# Patient Record
Sex: MALE | Race: WHITE
[De-identification: names, ages, dates, MRNs, and addresses within clinical notes are randomized per-mention and may not be internally consistent; named-entity substitution may affect disease eponyms.]

---

## 2017-03-04 NOTE — DIAGNOSTIC IMAGING REPORT
CT SCAN OF THE NECK WITH IV CONTRAST



CLINICAL HISTORY: Left neck pain. Neck mass.



COMPARISON STUDY:  No priors.



TECHNIQUE: Following the IV administration of 115 cc of Optiray 320, CT scan of

the soft tissues of the neck was performed from the skull base to the upper

chest. Images are reviewed in the axial, sagittal, and coronal planes.  IV

contrast was administered without complication.



CT DOSE: 423.94 mGy.cm



FINDINGS:



Pharynx: The nasopharynx, oropharynx, and laryngeal pharynx are normal in

appearance. The pharyngeal airway is widely patent. There is no evidence of mass

lesion. The vocal cords are symmetric. The parapharyngeal fat is well

maintained. The prevertebral/retropharyngeal soft tissues are within normal

limits. The epiglottis is normal.



Lymphadenopathy: No cervical lymphadenopathy is seen



Thyroid: Normal in size and attenuation.



Salivary glands: The parotid and submandibular glands are within normal limits.



Brain parenchyma: The visualized brain parenchyma at the skull base is normal in

appearance.



Vascular structures: The internal carotid arteries and jugular veins are widely

patent bilaterally. The cutaneous marker at the site of interest is located over

the left carotid bulb. There is mild intimal thickening seen within the distal

left common carotid artery and involving the proximal left internal carotid

artery. There is mild haziness in the surrounding fat. Similar but milder

findings are noted in the right carotid bulb.



Skeletal structures: Imaged portions of the calvarium at the skull base are

within normal limits. The cervical spine appears intact. Mild multilevel

cervical spondylosis is observed.



Sinuses and mastoids: Findings suggest previous paranasal sinus surgery. Mild

mucosal thickening is seen within the maxillary antra and the ethmoid sinuses.

The mastoid air cells are well pneumatized.



Lung apices: Visualized apical lung parenchyma is clear.





IMPRESSION:



1. The cutaneous marker at the site of interest overlies the left carotid bulb.

There is mild intimal thickening seen within the left carotid vessels at this

level with mild haziness in the surrounding fat. The appearance is nonspecific

but suggest a mild arteritis. Clinical correlation will be required.



2. Similar but milder findings are noted in the region of the right carotid

bulb.



3. The carotid vessels are widely patent.



4. There is no mass, fluid collection, or cervical lymphadenopathy identified.









Findings were discussed with Dr. Rodriguez in the emergency department at the

time of interpretation.







Electronically signed by:  Nehemiah Toth M.D.

3/4/2017 5:56 PM



Dictated Date/Time:  3/4/2017 5:40 PM

## 2017-03-04 NOTE — EMERGENCY ROOM VISIT NOTE
History


Report prepared by Pedro:  Mynor Dotson


Under the Supervision of:  Dr. Jose Rodriguez M.D.


First contact with patient:  14:56


Chief Complaint:  NECK PAIN


Stated Complaint:  PAIN IN LEFT SIDE OF NECK





History of Present Illness


The patient is a 61 year old male who presents to the Emergency Room with 

complaints of waxing & waning pain on the left side of the neck for the past 

two days. The pain was worse yesterday and last night than today. The pain is 

exacerbated when he pushes on his neck. The patient states that the pain is 

"where the artery is." He was seen at a [facility] urgent care clinic, where he 

was referred to the ED. The patient notes that he has had chest discomfort for 

several weeks. He denies visual changes, trouble speaking or swallowing, tooth 

aches, headaches, fevers,  rhinorrhea, shortness of breath, nausea, vomiting, 

weakness, numbness, pain or swelling of the legs. He has not been sick 

recently. The patient takes Omeprazole for GERD. He also takes Dilantin for a 

history of seizures and notes that he has not had a seizure for 12 years. The 

patient is not a smoker. He works as a  for the [employer].





   Source of History:  patient, spouse/significant other


   Onset:  two days ago


   Position:  neck


   Timing:  waxes/wanes


   Modifying Factors (Worsening):  other (touch)


   Associated Symptoms:  + chest pain, No SOB, No fevers, No headache, No nausea

, No numbness, No vomiting, No weakness





Review of Systems


See HPI for pertinent positives & negatives. A total of 10 systems reviewed and 

were otherwise negative.





Past Medical & Surgical


Medical Problems:


(1) GERD (gastroesophageal reflux disease)


(2) Seizure disorder





Old medical records were reviewed. Nurse's notes were reviewed and I agree with.





Family History





FH: heart disease


FHx: gallbladder disease


Hypertension





Social History


Smoking Status:  Never Smoker


Alcohol Use:  none


Occupation Status:  employed





Current/Historical Medications


Scheduled


Cyanocobalamin (B12), 1 TAB PO DAILY


Folic Acid (Folvite), 1 TAB PO DAILY


Omeprazole (Prilosec), 20 MG PO DAILY


Phenytoin Sodium Extended (Dilantin), 200 MG PO DAILY


Phenytoin Sodium Extended (Dilantin), 250 MG PO HS





Allergies


Coded Allergies:  


     Morphine (Unverified  Allergy, Unknown, IV, RED STREAKS UP ARM, 3/4/17)





Physical Exam


Vital Signs











  Date Time  Temp Pulse Resp B/P Pulse Ox O2 Delivery O2 Flow Rate FiO2


 


3/4/17 19:04 36.8 78 18 126/82 98   


 


3/4/17 18:35  78 18 126/82 98 Room Air  


 


3/4/17 15:39  68      


 


3/4/17 14:43 36.8 96 18 145/89 94 Room Air  











Physical Exam


General: Non ill-appearing middle aged male in no acute distress, speaking and 

swallowing without difficulty. 


HEENT: Normal cephalic atraumatic.  Pupils are equal round and reactive to 

light.  Sclerae anicteric.  Extraocular movements are intact.  Oropharynx is 

pink with moist mucous membranes, floor of mouth is soft without evidence of 

abscess.  No swelling of the mouth lips or tongue.


Neck: Supple with a midline trachea.  No meningeal signs or stiffness, no JVD 

or bruits. No Stridor. tenderness and swelling in the left anterior neck area 

in the area of the carotid on the left.  No definite mass.  No redness or 

warmth.


Chest: Clear to auscultation bilaterally.  No wheezes or rhonchi.  No increased 

work of breathing.


Heart: regular rate and rhythm. 


Abdomen: Soft nontender, nondistended without rebound guarding or rigidity.  


Extremities: No cyanosis clubbing or edema. No calf tenderness or assymetry


Spine/Back. Non tender to palpation. No CVA tenderness


Skin: Good turgor without rashes.


Neurologic exam: Cranial nerves two through 12 are intact.  Motor and sensation 

are intact and symmetrical throughout.





Medical Decision & Procedures


ER Provider


Diagnostic Interpretation:


Radiology results as stated below per my review and radiologist interpretation:





SINGLE VIEW CHEST





CLINICAL HISTORY:  Atypical chest pain.





FINDINGS: An AP, portable, upright chest radiograph is obtained. No prior


studies are available for comparison at the time of dictation. The examination


is degraded by portable technique and patient rotation.  The cardiomediastinal


silhouette is unremarkable. Nonspecific interstitial thickening is noted. The


lungs and pleural spaces are clear. No pneumothorax is seen. The bony thorax is


grossly intact.





IMPRESSION: No acute cardiopulmonary abnormality.











Electronically signed by:  Nehemiah Toth M.D.


3/4/2017 3:28 PM





Dictated Date/Time:  3/4/2017 3:27 PM








CT SCAN OF THE NECK WITH IV CONTRAST





CLINICAL HISTORY: Left neck pain. Neck mass.





COMPARISON STUDY:  No priors.





TECHNIQUE: Following the IV administration of 115 cc of Optiray 320, CT scan of


the soft tissues of the neck was performed from the skull base to the upper


chest. Images are reviewed in the axial, sagittal, and coronal planes.  IV


contrast was administered without complication.





CT DOSE: 423.94 mGy.cm





FINDINGS:





Pharynx: The nasopharynx, oropharynx, and laryngeal pharynx are normal in


appearance. The pharyngeal airway is widely patent. There is no evidence of mass


lesion. The vocal cords are symmetric. The parapharyngeal fat is well


maintained. The prevertebral/retropharyngeal soft tissues are within normal


limits. The epiglottis is normal.





Lymphadenopathy: No cervical lymphadenopathy is seen





Thyroid: Normal in size and attenuation.





Salivary glands: The parotid and submandibular glands are within normal limits.





Brain parenchyma: The visualized brain parenchyma at the skull base is normal in


appearance.





Vascular structures: The internal carotid arteries and jugular veins are widely


patent bilaterally. The cutaneous marker at the site of interest is located over


the left carotid bulb. There is mild intimal thickening seen within the distal


left common carotid artery and involving the proximal left internal carotid


artery. There is mild haziness in the surrounding fat. Similar but milder


findings are noted in the right carotid bulb.





Skeletal structures: Imaged portions of the calvarium at the skull base are


within normal limits. The cervical spine appears intact. Mild multilevel


cervical spondylosis is observed.





Sinuses and mastoids: Findings suggest previous paranasal sinus surgery. Mild


mucosal thickening is seen within the maxillary antra and the ethmoid sinuses.


The mastoid air cells are well pneumatized.





Lung apices: Visualized apical lung parenchyma is clear.








IMPRESSION:





1. The cutaneous marker at the site of interest overlies the left carotid bulb.


There is mild intimal thickening seen within the left carotid vessels at this


level with mild haziness in the surrounding fat. The appearance is nonspecific


but suggest a mild arteritis. Clinical correlation will be required.





2. Similar but milder findings are noted in the region of the right carotid


bulb.





3. The carotid vessels are widely patent.





4. There is no mass, fluid collection, or cervical lymphadenopathy identified.














Findings were discussed with Dr. Rodriguez in the emergency department at the


time of interpretation.











Electronically signed by:  Nehemiah Toth M.D.


3/4/2017 5:56 PM





Dictated Date/Time:  3/4/2017 5:40 PM





Laboratory Results


3/4/17 16:00








Red Blood Count 4.60, Mean Corpuscular Volume 89.3, Mean Corpuscular Hemoglobin 

32.0, Mean Corpuscular Hemoglobin Concent 35.8, Mean Platelet Volume 9.3, 

Neutrophils (%) (Auto) 61.3, Lymphocytes (%) (Auto) 25.3, Monocytes (%) (Auto) 

9.7, Eosinophils (%) (Auto) 2.9, Basophils (%) (Auto) 0.5, Neutrophils # (Auto) 

3.99, Lymphocytes # (Auto) 1.65, Monocytes # (Auto) 0.63, Eosinophils # (Auto) 

0.19, Basophils # (Auto) 0.03





3/4/17 16:00

















Test


  3/4/17


16:00 3/4/17


16:05


 


White Blood Count


  6.51 K/uL


(4.8-10.8) 


 


 


Red Blood Count


  4.60 M/uL


(4.7-6.1) 


 


 


Hemoglobin


  14.7 g/dL


(14.0-18.0) 


 


 


Hematocrit 41.1 % (42-52)  


 


Mean Corpuscular Volume


  89.3 fL


() 


 


 


Mean Corpuscular Hemoglobin


  32.0 pg


(25-34) 


 


 


Mean Corpuscular Hemoglobin


Concent 35.8 g/dl


(32-36) 


 


 


Platelet Count


  152 K/uL


(130-400) 


 


 


Mean Platelet Volume


  9.3 fL


(7.4-10.4) 


 


 


Neutrophils (%) (Auto) 61.3 %  


 


Lymphocytes (%) (Auto) 25.3 %  


 


Monocytes (%) (Auto) 9.7 %  


 


Eosinophils (%) (Auto) 2.9 %  


 


Basophils (%) (Auto) 0.5 %  


 


Neutrophils # (Auto)


  3.99 K/uL


(1.4-6.5) 


 


 


Lymphocytes # (Auto)


  1.65 K/uL


(1.2-3.4) 


 


 


Monocytes # (Auto)


  0.63 K/uL


(0.11-0.59) 


 


 


Eosinophils # (Auto)


  0.19 K/uL


(0-0.5) 


 


 


Basophils # (Auto)


  0.03 K/uL


(0-0.2) 


 


 


RDW Standard Deviation


  43.4 fL


(36.4-46.3) 


 


 


RDW Coefficient of Variation


  13.2 %


(11.5-14.5) 


 


 


Immature Granulocyte % (Auto) 0.3 %  


 


Immature Granulocyte # (Auto)


  0.02 K/uL


(0.00-0.02) 


 


 


Erythrocyte Sedimentation Rate 2 mm/hr (0-14)  


 


Anion Gap


  10.0 mmol/L


(3-11) 


 


 


Est Creatinine Clear Calc


Drug Dose 84.3 ml/min 


  


 


 


Estimated GFR (


American) 107.0 


  


 


 


Estimated GFR (Non-


American 92.3 


  


 


 


BUN/Creatinine Ratio 21.7 (10-20)  


 


Calcium Level


  8.2 mg/dl


(8.5-10.1) 


 


 


Total Bilirubin


  0.1 mg/dl


(0.2-1) 


 


 


Direct Bilirubin


  < 0.1 mg/dl


(0-0.2) 


 


 


Aspartate Amino Transf


(AST/SGOT) 15 U/L (15-37) 


  


 


 


Alanine Aminotransferase


(ALT/SGPT) 24 U/L (12-78) 


  


 


 


Alkaline Phosphatase


  121 U/L


() 


 


 


C-Reactive Protein


  < 0.29 mg/dl


(0-0.29) 


 


 


Total Protein


  6.8 gm/dl


(6.4-8.2) 


 


 


Albumin


  3.6 gm/dl


(3.4-5.0) 


 


 


Lipase


  83 U/L


() 


 


 


Bedside Troponin I


  


  0.000 ng/ml


(0-0.045)





Laboratory studies as stated above per my review.





Medications Administered











 Medications


  (Trade)  Dose


 Ordered  Sig/Angélica


 Route  Start Time


 Stop Time Status Last Admin


Dose Admin


 


 Sodium Chloride  250 ml @ 


 999 mls/hr  Q16M STAT


 IV  3/4/17 15:07


 3/4/17 15:22 DC 3/4/17 16:07


999 MLS/HR


 


 Sodium Chloride


  (Nss 1000ml)  1,000 ml @ 


 100 mls/hr  Q10H STAT


 IV  3/4/17 15:07


 3/4/17 19:20 DC 3/4/17 16:08


100 MLS/HR











ECG


Indication:  other (left neck pain)


Rate (beats per minute):  65


Rhythm:  normal sinus


Findings:  RBBB, no acute ischemic change, no ectopy


Comparison ECG Date:  no prior available





ED Course


1500: Past medical records reviewed. The patient was evaluated in room B7, and 

a complete history and physical examination were performed. 





1507: NSS 1000 ml @ 100 mls/hr,  ml @ 999 mls/hr.





1625: The patient is comfortable. He will go to CT scan.





1819: Spoke with Dr. King, Vascular Surgeon. He recommended follow up with 

Rheumatology.





1830: spoke with the on call Rheumatologist, who does not believe that the 

patient has vasculitis as his sed rate is normal.





1850: Reassessed the patient. Discussed the findings with him. He verbalized 

understanding and agreement of the treatment plan. The patient is ready for 

discharge.





Medical Decision


Differential diagnosis includes lymphadenopathy, infection, vascular pathology, 

malignancy.





This patient comes in as described above.  He had left anterior neck pain.  It'

s the area chronic near the carotid.  He has a normal neurologic exam and has 

no other symptoms.  He's had no injury.  There is no respiratory symptoms.  He 

is no evidence of airway compromise.  No trouble speaking or swallowing.  No 

chest pain or shortness of breath.  IV access established and multiple blood 

testing was obtained.  He has no white count or fever to suggest infection.  He 

has negative inflammatory markers including a normal sedimentation rate and 

CRP.  He has no electrolyte and metabolic abnormality.  He has nothing to 

suggest an acute coronary syndrome or arrhythmia.  I did a CT of his neck there 

is no acute abnormality seen with exception of questionable nonspecific changes 

around the left carotid.  It could represent a mild arteritis according to the 

radiologist who I talked to.  I did talk to Dr. King, the vascular surgeon on-

call, and he did not feel is any other acute treatment needed at this point.  I 

also talked the rheumatologist who felt that this was unlikely arteritis given 

the fact that the sedimentation rate was normal.  The patient looks well.  I 

will have him use anti-inflammatories.  I encouraged close follow-up with his 

doctor Monday for recheck or return to the week ER over the weekend if: 

Increasing pain, swelling, redness, warmth, numbness, weakness, chest pain, 

shortness of breath, any new problems or concerns.  The patient and his wife, 

who I talked to at length as well, were both happy with plan and he was 

discharged to home.





Consults


Time Called:  1810


Consulting Physician:  Dr. King, Vascular Surgeon.


Returned Call:  1819 1819: Spoke with Dr. King, Vascular Surgeon. He recommended follow up with 

Rheumatology.


Additional Consults:  


   Time Called:  1820


   Consulted Physician:  Rheumatologist


   Returned Call:  1830


Additional Comments:


1830: spoke with the on call Rheumatologist, who does not believe that the 

patient has vasculitis as his sed rate is normal.





Impression





 Primary Impression:  


 Carotidynia


 Additional Impression:  


 Neck pain, acute





Scribe Attestation


The scribe's documentation has been prepared under my direction and personally 

reviewed by me in its entirety. I confirm that the note above accurately 

reflects all work, treatment, procedures, and medical decision making performed 

by me.





Departure Information


Dispostion


Home / Self-Care





Referrals


Jesus Paulson D.O. (PCP)





Forms


HOME CARE DOCUMENTATION FORM,                                                 

               IMPORTANT VISIT INFORMATION, WORK / SCHOOL INSTRUCTIONS





Patient Instructions


My [facility]





Additional Instructions





Rest.  Drink plenty of fluids.





For pain, Use ibuprofen 400 mg every 6 hours, take with food





Return if: Increasing pain, fever or chills, worsening symptoms, numbness or 

weakness, any new problems or concerns.





Problem Qualifiers

## 2017-03-04 NOTE — DIAGNOSTIC IMAGING REPORT
SINGLE VIEW CHEST



CLINICAL HISTORY:  Atypical chest pain.



FINDINGS: An AP, portable, upright chest radiograph is obtained. No prior

studies are available for comparison at the time of dictation. The examination

is degraded by portable technique and patient rotation.  The cardiomediastinal

silhouette is unremarkable. Nonspecific interstitial thickening is noted. The

lungs and pleural spaces are clear. No pneumothorax is seen. The bony thorax is

grossly intact.



IMPRESSION: No acute cardiopulmonary abnormality.







Electronically signed by:  Nehemiah Toth M.D.

3/4/2017 3:28 PM



Dictated Date/Time:  3/4/2017 3:27 PM

## 2017-05-24 NOTE — DIAGNOSTIC IMAGING REPORT
CHEST ONE VIEW PORTABLE



CLINICAL HISTORY: Weakness. Fever.    



COMPARISON STUDY:  Chest radiograph March 4, 2017.



FINDINGS: There is no pneumothorax or pleural effusion. Lung volumes are

diminished. Bibasilar opacities are present. Cardiac size is normal. There is no

evidence of pulmonary edema. 



IMPRESSION:  Bibasilar opacities which statistically reflect atelectasis on this

hypoventilatory study. Consolidation is considered less likely. 









Electronically signed by:  Cruz Deluna M.D.

5/24/2017 10:25 PM



Dictated Date/Time:  5/24/2017 10:25 PM

## 2017-05-25 NOTE — PROGRESS NOTE
Internal Med Progress Note


Date of Service:


May 25, 2017.


Provider Documentation:





SUBJECTIVE:





says he is feeling somewhat better today


has some sob and cough


afebrile


no nausea


denies chest pain


complains of headache








OBJECTIVE:





Vital Signs-as noted below





Exam:


General-alert and awake and oriented x 3


ENT-normal hearing


Neck-no neck masses


Lungs-cta b/l no wheezing or crackles


Heart-s1 and s2 heard regular rate and rhythm no murmurs


Abdomen-soft bowel sounds present non tender no distension 


Extremities- no edema present no erythema


Neuro-alert and awake oriented x 3


moves extremities


Lab data as noted below.


ASSESSMENT & PLAN:


1.  Bibasilar pneumonia with influenza B.  


on Tamiflu and Levaquin


improving


will monitor


2.  Epilepsy. On phenytoin.  stable.





3.  Esophageal reflux,  on omeprazole.  





4.  Asthma in remission, does not use any medications, has minimal wheezing. 


Will give nebulized bronchodilator while in the hospital.Stable





5.  Deep venous thrombosis prophylaxis with subcutaneous heparin.





6.  Gastrointestinal prophylaxis with omeprazole.





7.  Code status.  He will be a full code.








DISPOSITION


to be determined


Vital Signs:











  Date Time  Temp Pulse Resp B/P Pulse Ox O2 Delivery O2 Flow Rate FiO2


 


5/25/17 16:00      Room Air  


 


5/25/17 15:35 36.9 90 20 109/65 91 Room Air  


 


5/25/17 14:56  87 14  98 Room Air  


 


5/25/17 08:00      Room Air  


 


5/25/17 07:40 37.2 99 16 117/67 90   


 


5/25/17 01:45 37.2 95 16 103/62 95 Room Air  


 


5/25/17 01:23  95 18 113/66 96   


 


5/24/17 23:25  107 16 126/78 96 Nasal Cannula 2.0 


 


5/24/17 22:32  108      


 


5/24/17 22:28     94 Room Air  


 


5/24/17 21:47 38.8 120 28 122/80 94 Room Air  








Lab Results:





Results Past 24 Hours








Test


  5/24/17


22:00 5/24/17


22:16 5/24/17


22:18 5/24/17


22:30 Range/Units


 


 


White Blood Count 6.04    4.8-10.8  K/uL


 


Red Blood Count 4.63    4.7-6.1  M/uL


 


Hemoglobin 14.5    14.0-18.0  g/dL


 


Hematocrit 41.5    42-52  %


 


Mean Corpuscular Volume 89.6      fL


 


Mean Corpuscular Hemoglobin 31.3    25-34  pg


 


Mean Corpuscular Hemoglobin


Concent 34.9


  


  


  


  32-36  g/dl


 


 


Platelet Count 137    130-400  K/uL


 


Mean Platelet Volume 9.6    7.4-10.4  fL


 


Neutrophils (%) (Auto) 76.9     %


 


Lymphocytes (%) (Auto) 11.6     %


 


Monocytes (%) (Auto) 9.9     %


 


Eosinophils (%) (Auto) 1.2     %


 


Basophils (%) (Auto) 0.2     %


 


Neutrophils # (Auto) 4.65    1.4-6.5  K/uL


 


Lymphocytes # (Auto) 0.70    1.2-3.4  K/uL


 


Monocytes # (Auto) 0.60    0.11-0.59  K/uL


 


Eosinophils # (Auto) 0.07    0-0.5  K/uL


 


Basophils # (Auto) 0.01    0-0.2  K/uL


 


RDW Standard Deviation 44.9    36.4-46.3  fL


 


RDW Coefficient of Variation 13.7    11.5-14.5  %


 


Immature Granulocyte % (Auto) 0.2     %


 


Immature Granulocyte # (Auto) 0.01    0.00-0.02  K/uL


 


Prothrombin Time


  10.5


  


  


  


  9.0-12.0


SECONDS


 


Prothromb Time International


Ratio 1.0


  


  


  


  0.9-1.1  


 


 


Activated Partial


Thromboplast Time 29.5


  


  


  


  21.0-31.0


SECONDS


 


Partial Thromboplastin Ratio 1.1     


 


Sodium Level 139    136-145  mmol/L


 


Potassium Level 3.4    3.5-5.1  mmol/L


 


Chloride Level 105      mmol/L


 


Carbon Dioxide Level 25    21-32  mmol/L


 


Anion Gap 9.0    3-11  mmol/L


 


Blood Urea Nitrogen 11    7-18  mg/dl


 


Creatinine


  0.95


  


  


  


  0.60-1.40


mg/dl


 


Est Creatinine Clear Calc


Drug Dose 78.0


  


  


  


   ml/min


 


 


Estimated GFR (


American) 99.0


  


  


  


   


 


 


Estimated GFR (Non-


American 85.4


  


  


  


   


 


 


BUN/Creatinine Ratio 11.8    10-20  


 


Random Glucose 132    70-99  mg/dl


 


Calcium Level 8.4    8.5-10.1  mg/dl


 


Magnesium Level 2.0    1.8-2.4  mg/dl


 


Total Bilirubin 0.6    0.2-1  mg/dl


 


Direct Bilirubin 0.3    0-0.2  mg/dl


 


Aspartate Amino Transf


(AST/SGOT) 74


  


  


  


  15-37  U/L


 


 


Alanine Aminotransferase


(ALT/SGPT) 67


  


  


  


  12-78  U/L


 


 


Alkaline Phosphatase 115      U/L


 


Troponin I < 0.015    0-0.045  ng/ml


 


Total Protein 6.9    6.4-8.2  gm/dl


 


Albumin 3.7    3.4-5.0  gm/dl


 


Lipase 166      U/L


 


Thyroid Stimulating Hormone


(TSH) 0.980


  


  


  


  0.300-4.500


uIu/ml


 


Bedside Lactic Acid Venous


  


  1.23


  


  


  0.90-1.70


mmol/L


 


Urine Color   DK YELLOW   


 


Urine Appearance   CLEAR  CLEAR  


 


Urine pH   6.5  4.5-7.5  


 


Urine Specific Gravity   1.017  1.000-1.030  


 


Urine Protein   NEG  NEG  


 


Urine Glucose (UA)   NEG  NEG  


 


Urine Ketones   1+  NEG  


 


Urine Occult Blood   NEG  NEG  


 


Urine Nitrite   NEG  NEG  


 


Urine Bilirubin   NEG  NEG  


 


Urine Urobilinogen   NEG  NEG  


 


Urine Leukocyte Esterase   NEG  NEG  


 


Influenza Type A Antigen    Neg for Influ A NEG  


 


Influenza Type B Antigen    POS for Influ B NEG  














Test


  5/24/17


23:54 5/25/17


06:53 


  


  Range/Units


 


 


Troponin I < 0.015    0-0.045  ng/ml


 


White Blood Count  3.99   4.8-10.8  K/uL


 


Red Blood Count  4.14   4.7-6.1  M/uL


 


Hemoglobin  13.1   14.0-18.0  g/dL


 


Hematocrit  37.4   42-52  %


 


Mean Corpuscular Volume  90.3     fL


 


Mean Corpuscular Hemoglobin  31.6   25-34  pg


 


Mean Corpuscular Hemoglobin


Concent 


  35.0


  


  


  32-36  g/dl


 


 


RDW Standard Deviation  46.0   36.4-46.3  fL


 


RDW Coefficient of Variation  13.7   11.5-14.5  %


 


Platelet Count  119   130-400  K/uL


 


Mean Platelet Volume  9.6   7.4-10.4  fL


 


Sodium Level  138   136-145  mmol/L


 


Potassium Level  3.6   3.5-5.1  mmol/L


 


Chloride Level  105     mmol/L


 


Carbon Dioxide Level  26   21-32  mmol/L


 


Anion Gap  7.0   3-11  mmol/L


 


Blood Urea Nitrogen  10   7-18  mg/dl


 


Creatinine


  


  0.87


  


  


  0.60-1.40


mg/dl


 


Est Creatinine Clear Calc


Drug Dose 


  85.1


  


  


   ml/min


 


 


Estimated GFR (


American) 


  107.2


  


  


   


 


 


Estimated GFR (Non-


American 


  92.5


  


  


   


 


 


BUN/Creatinine Ratio  11.1   10-20  


 


Random Glucose  118   70-99  mg/dl


 


Calcium Level  7.4   8.5-10.1  mg/dl


 


Phosphorus Level  3.1   2.5-4.9  mg/dl


 


Lyme Disease IgG Antibody  NEG   NEG  


 


Lyme Disease IgM Antibody  NEG   NEG  








 Microbiology Results


5/24/17 Blood Culture, Received


          Pending


5/24/17 Blood Culture, Received


          Pending


5/24/17 Urine Culture - Preliminary, Resulted


          PIN-POINT GROWTH PRESENT, REINCUBATING.

## 2017-05-25 NOTE — HISTORY & PHYSICAL EXAMINATION
DATE OF ADMISSION:  05/24/2017

 

PRIMARY CARE PHYSICIAN:  Dr Paulson

 

CHIEF COMPLAINT:  Cough with fever and weakness since Monday night.

 

HISTORY OF PRESENT COMPLAINT:  He is a 62-year-old male with significant past

medical history of epilepsy, esophageal reflux, asthma in remission and also

chronic back pain, apparently has been complaining of cough with weakness and

fever since Monday midnight.  He woke up at midnight Monday with cough.  At

that time, he was feeling warm and had some cough without any production of

any phlegm.  He went to work but the condition has been getting worse. 

Today, he was really very tired, very warm and sweaty at times, more cough

and some shortness of breath.  He came to Emergency Room for a checkup.  He

was noted to have a fever of 38.8 with a pulse rate of 120 initially and

normal saturation, and he was positive for influenza B and also x-ray did

show bibasilar infiltration.  From that point, blood culture was taken and he

was started with intravenous Levaquin and also oral Tamiflu and was advised

admission.

 

When asking question, he mentions that he has been in [place] recently and

while in there, he had a bite by an insect on his left cheek and that was red

and just light a dime in size.  He does have some swelling in the left facial

area since then, but the bite blanca is gone.  He denies to have any chest

pain.  He does not have any shortness of breath.  He denies to have any

swelling of the legs or any joint pain.  He does not have any nausea or

vomiting.  He does not have any headache or any blurred vision, or any

numbness or tingling in the extremities.

 

PAST MEDICAL HISTORY:  Significant for epilepsy, esophageal reflux, history

of right bundle branch block, asthma in remission, and back pain secondary to

lumbar intervertebral disk disease.

 

PAST SURGICAL HISTORY:  Repair of nasal septum and laparoscopic hernia

repair.

 

FAMILY HISTORY:  Father had CAD and PTCA with a stent and mother had CAD as

well.

 

SOCIAL HISTORY:  He is .  He lives with his wife.  He has 2 children. 

He does not smoke and drinks occasionally and he has a full time job.

 

ALLERGIES:  MORPHINE.

 

MEDICATIONS:  He has been on cyanocobalamin 1000 mcg daily, folic acid 1 mg

daily, omeprazole 20 mg daily, and phenytoin sodium 200 mg twice daily.

 

REVIEW OF SYSTEMS:  Other systemic review unremarkable except for those

mentioned in history of present complaint.

 

PHYSICAL EXAMINATION:

GENERAL:  On examination in the Emergency Room, he was not having any acute

distress except some weakness and cough, nonproductive.

HEENT:  Unremarkable.

VITAL SIGNS:  Temperature 38.8, pulse was 107, blood pressure 126/78,

saturation 96% on 2 liters nasal cannula.

HEENT:  Unremarkable.

NECK:  Supple.  No JVD, no bruit, no lymphadenopathy.

CHEST:  Occasional crackles at the bases.

HEART:  S1, S2 regular, no murmur.

ABDOMEN:  Soft, benign, nontender, no organomegaly.  Bowel sounds present.

EXTREMITIES:  Negative for any edema.

MUSCULOSKELETAL:  Did not show any acute arthritis.

CENTRAL NERVOUS SYSTEM:  Alert, awake, oriented x3.  No focal sensory and/or

motor deficit appreciated.

 

LABORATORY DATA:  Noted today - white count was 6.04, H\T\H 14.5/41.5,

platelets was 137.  Sodium 139, potassium 3.4, chloride 105, carbon dioxide

25, BUN 11, creatinine 0.95, random glucose 132, lactic acid 1.23, calcium

8.4, magnesium 2.0.  Total bili 0.6, AST 74, ALT 67, alkaline phosphatase

115.  Troponin less than 0.015.  TSH was 0.980.  Lipase 166.   PT/INR

unremarkable.  UA examination unremarkable.

 

IMAGING DATA:  Echocardiogram is pending and chest x-ray reported as

bibasilar opacities which statistically reflect atelectasis, consolidation is

considered less likely.

 

IMPRESSION AND PLAN:

1.  Bibasilar pneumonia with influenza B.  The patient will be admitted to

medical floor.  Respiratory isolation.  He was started with intravenous

Levaquin after taking blood culture and also oral Tamiflu.  He was given

nebulized bronchodilator for ongoing cough and wheezing.  He will have IV

fluid with potassium.Lyme titer for possible tick bite about 2-3 weeks ago.

2.  Epilepsy.  Has not had any seizure for the last 10 years.  Continue with

phenytoin.  We will check phenytoin level tomorrow morning.

3.  Esophageal reflux, has been on omeprazole.  Continue with that

4.  Asthma in remission, does not use any medications, has minimal wheezing. 

Will give nebulized bronchodilator while in the hospital.

5.  Deep venous thrombosis prophylaxis with subcutaneous heparin.

6.  Gastrointestinal prophylaxis with omeprazole.

7.  Code status.  He will be a full code.

 

In my clinical judgment, the beneficiary meets criteria as per CMS for

2-midnight stay in the hospital.

 

 

 

MTDDANIS

## 2017-05-25 NOTE — EMERGENCY ROOM VISIT NOTE
History


Report prepared by Pedro:  Pretty Ferrari


Under the Supervision of:  Dr. Ubaldo Corea M.D.


First contact with patient:  21:52


Chief Complaint:  FEVER


Stated Complaint:  FEVER, VERY COLD, LABORED BREATHING SINCE 5/22





History of Present Illness


The patient is a 62 year old male who presents to the Emergency Room with 

complaints of worsening fever beginning 2 days ago.  He currently rates his 

discomfort as an 8/10 in severity. The patient reports he had a sinus infection 

two weeks ago prior to his cruise that was treated with antibiotics. The 

patient notes that he noticed flu-like symptoms around midnight 2 days ago. The 

patient complains of swelling, pain, and erythema on the left side of his face, 

describing his discomfort as a burning pain. The patient states he feels a 

stinging in his left eye. The patient still notes discomfort to the left side 

of his face. The patient also complains of a cough and shortness of breath. He 

reports he can breathe air in but has difficulty in getting air out. The 

patient states that symptoms exacerbate with movement. He reports that he has 

been taking Mucinex, Tylenol and Ibuprofen without relief of his symptoms.





   Source of History:  patient


   Onset:  two days ago


   Position:  other (global)


   Symptom Intensity:  8/10


   Quality:  other (fever)


   Timing:  worsening


   Associated Symptoms:  + SOB, + cough


Note:


Associated Symtpoms: Pain swelling erythema on left side of face, flu like 

symptoms





Review of Systems


See HPI for pertinent positives and negatives.  A total of ten systems were 

reviewed and were otherwise negative.





Past Medical & Surgical


Medical Problems:


(1) GERD (gastroesophageal reflux disease)


(2) Influenza B


(3) Pneumonia


(4) Seizure disorder








Family History





FH: heart disease


FHx: gallbladder disease


Hypertension





Social History


Smoking Status:  Never Smoker


Alcohol Use:  none


Occupation Status:  employed





Current/Historical Medications


Scheduled


Cyanocobalamin (B12), 1 TAB PO DAILY


Folic Acid (Folvite), 1 TAB PO DAILY


Omeprazole (Prilosec), 20 MG PO DAILY


Phenytoin Sodium Extended (Dilantin), 200 MG PO BID





Allergies


Coded Allergies:  


     Morphine (Unverified  Allergy, Unknown, IV, RED STREAKS UP ARM, 5/24/17)





Physical Exam


Vital Signs











  Date Time  Temp Pulse Resp B/P Pulse Ox O2 Delivery O2 Flow Rate FiO2


 


5/24/17 23:25  107 16 126/78 96 Nasal Cannula 2.0 


 


5/24/17 22:32  108      


 


5/24/17 22:28     94 Room Air  


 


5/24/17 21:47 38.8 120 28 122/80 94 Room Air  











Physical Exam


GENERAL: Awake, alert, mildly ill and very uncomfortable appearing, no distress


HEAD: Normocephalic, atraumatic. No edema.


EYES: Normal conjunctiva. Sclera non-icteric.


EARS: Right TM normal. Left TM normal.


NOSE: Mild congestion.


OROPHARYNX: Lips, tongue, and mucosa unremarkable. No erythema or exudate.


NECK: Supple. No nuchal rigidity. FROM. No adenopathy.  Negative jolt 

accentuation test.


RESPIRATORY: CTA bilaterally. No wheezes rales or rhonchi.


CARDIAC: Borderline tachycardic rate, normal rhythm.


ABDOMEN: Soft, non distended. No tenderness to palpation. 


NEURO: Normal sensorium. 


SKIN: No rash or jaundice noted





Medical Decision & Procedures


ER Provider


Diagnostic Interpretation:


X-ray: Per my interpretation, radiologist review. 





CHEST ONE VIEW PORTABLE





CLINICAL HISTORY: Weakness. Fever.    





COMPARISON STUDY:  Chest radiograph March 4, 2017.





FINDINGS: There is no pneumothorax or pleural effusion. Lung volumes are


diminished. Bibasilar opacities are present. Cardiac size is normal. There is no


evidence of pulmonary edema. 





IMPRESSION:  Bibasilar opacities which statistically reflect atelectasis on this


hypoventilatory study. Consolidation is considered less likely. 





Electronically signed by:  Cruz Deluna M.D.


5/24/2017 10:25 PM





Dictated Date/Time:  5/24/2017 10:25 PM





Laboratory Results


5/24/17 22:00








Red Blood Count 4.63, Mean Corpuscular Volume 89.6, Mean Corpuscular Hemoglobin 

31.3, Mean Corpuscular Hemoglobin Concent 34.9, Mean Platelet Volume 9.6, 

Neutrophils (%) (Auto) 76.9, Lymphocytes (%) (Auto) 11.6, Monocytes (%) (Auto) 

9.9, Eosinophils (%) (Auto) 1.2, Basophils (%) (Auto) 0.2, Neutrophils # (Auto) 

4.65, Lymphocytes # (Auto) 0.70, Monocytes # (Auto) 0.60, Eosinophils # (Auto) 

0.07, Basophils # (Auto) 0.01





5/24/17 22:00

















Test


  5/24/17


22:00 5/24/17


22:16 5/24/17


22:18 5/24/17


22:30


 


White Blood Count


  6.04 K/uL


(4.8-10.8) 


  


  


 


 


Red Blood Count


  4.63 M/uL


(4.7-6.1) 


  


  


 


 


Hemoglobin


  14.5 g/dL


(14.0-18.0) 


  


  


 


 


Hematocrit 41.5 % (42-52)    


 


Mean Corpuscular Volume


  89.6 fL


() 


  


  


 


 


Mean Corpuscular Hemoglobin


  31.3 pg


(25-34) 


  


  


 


 


Mean Corpuscular Hemoglobin


Concent 34.9 g/dl


(32-36) 


  


  


 


 


Platelet Count


  137 K/uL


(130-400) 


  


  


 


 


Mean Platelet Volume


  9.6 fL


(7.4-10.4) 


  


  


 


 


Neutrophils (%) (Auto) 76.9 %    


 


Lymphocytes (%) (Auto) 11.6 %    


 


Monocytes (%) (Auto) 9.9 %    


 


Eosinophils (%) (Auto) 1.2 %    


 


Basophils (%) (Auto) 0.2 %    


 


Neutrophils # (Auto)


  4.65 K/uL


(1.4-6.5) 


  


  


 


 


Lymphocytes # (Auto)


  0.70 K/uL


(1.2-3.4) 


  


  


 


 


Monocytes # (Auto)


  0.60 K/uL


(0.11-0.59) 


  


  


 


 


Eosinophils # (Auto)


  0.07 K/uL


(0-0.5) 


  


  


 


 


Basophils # (Auto)


  0.01 K/uL


(0-0.2) 


  


  


 


 


RDW Standard Deviation


  44.9 fL


(36.4-46.3) 


  


  


 


 


RDW Coefficient of Variation


  13.7 %


(11.5-14.5) 


  


  


 


 


Immature Granulocyte % (Auto) 0.2 %    


 


Immature Granulocyte # (Auto)


  0.01 K/uL


(0.00-0.02) 


  


  


 


 


Prothrombin Time


  10.5 SECONDS


(9.0-12.0) 


  


  


 


 


Prothromb Time International


Ratio 1.0 (0.9-1.1) 


  


  


  


 


 


Activated Partial


Thromboplast Time 29.5 SECONDS


(21.0-31.0) 


  


  


 


 


Partial Thromboplastin Ratio 1.1    


 


Anion Gap


  9.0 mmol/L


(3-11) 


  


  


 


 


Est Creatinine Clear Calc


Drug Dose 78.0 ml/min 


  


  


  


 


 


Estimated GFR (


American) 99.0 


  


  


  


 


 


Estimated GFR (Non-


American 85.4 


  


  


  


 


 


BUN/Creatinine Ratio 11.8 (10-20)    


 


Calcium Level


  8.4 mg/dl


(8.5-10.1) 


  


  


 


 


Magnesium Level


  2.0 mg/dl


(1.8-2.4) 


  


  


 


 


Total Bilirubin


  0.6 mg/dl


(0.2-1) 


  


  


 


 


Direct Bilirubin


  0.3 mg/dl


(0-0.2) 


  


  


 


 


Aspartate Amino Transf


(AST/SGOT) 74 U/L (15-37) 


  


  


  


 


 


Alanine Aminotransferase


(ALT/SGPT) 67 U/L (12-78) 


  


  


  


 


 


Alkaline Phosphatase


  115 U/L


() 


  


  


 


 


Total Protein


  6.9 gm/dl


(6.4-8.2) 


  


  


 


 


Albumin


  3.7 gm/dl


(3.4-5.0) 


  


  


 


 


Lipase


  166 U/L


() 


  


  


 


 


Thyroid Stimulating Hormone


(TSH) 0.980 uIu/ml


(0.300-4.500) 


  


  


 


 


Bedside Lactic Acid Venous


  


  1.23 mmol/L


(0.90-1.70) 


  


 


 


Urine Color   DK YELLOW  


 


Urine Appearance   CLEAR (CLEAR)  


 


Urine pH   6.5 (4.5-7.5)  


 


Urine Specific Gravity


  


  


  1.017


(1.000-1.030) 


 


 


Urine Protein   NEG (NEG)  


 


Urine Glucose (UA)   NEG (NEG)  


 


Urine Ketones   1+ (NEG)  


 


Urine Occult Blood   NEG (NEG)  


 


Urine Nitrite   NEG (NEG)  


 


Urine Bilirubin   NEG (NEG)  


 


Urine Urobilinogen   NEG (NEG)  


 


Urine Leukocyte Esterase   NEG (NEG)  


 


Influenza Type A Antigen


  


  


  


  Neg for Influ


A (NEG)


 


Influenza Type B Antigen


  


  


  


  POS for Influ


B (NEG)














Test


  5/24/17


23:54 


  


  


 


 


Troponin I


  < 0.015 ng/ml


(0-0.045) 


  


  


 





Laboratory results reviewed by me





Medications Administered











 Medications


  (Trade)  Dose


 Ordered  Sig/Angélica


 Route  Start Time


 Stop Time Status Last Admin


Dose Admin


 


 Sodium Chloride  1,000 ml @ 


 125 mls/hr  Q8H STAT


 IV  5/24/17 21:55


 5/25/17 05:54  5/24/17 21:55


125 MLS/HR


 


 Sodium Chloride


  (Nss 1000ml)  1,000 ml @ 


 999 mls/hr  Q1H1M STAT


 IV  5/24/17 21:55


 5/24/17 22:55 DC 5/24/17 22:33


999 MLS/HR


 


 Acetaminophen


  (Tylenol Tab)  1,000 mg  NOW  STAT


 PO  5/24/17 21:55


 5/24/17 21:56 DC 5/24/17 22:43


1,000 MG


 


 Albuterol/


 Ipratropium


  (Duoneb)  3 ml  NOW  STAT


 INH  5/24/17 22:03


 5/24/17 22:04 DC 5/24/17 22:43


3 ML


 


 Oseltamivir


 Phosphate


  (Tamiflu Cap)  75 mg  NOW  STAT


 PO  5/24/17 23:54


 5/24/17 23:55 DC 5/25/17 00:06


75 MG











ECG


Indication:  SOB/dyspnea


Rate (beats per minute):  102


Rhythm:  sinus tachycardia


Findings:  nonspecific-ST abn, RBBB, no ectopy





ED Course


2155: Ordered Tylenol Tab 1000 mg PO, Sodium chloride 1000 ml @ 999 mls/hr IV, 

Sodium Chloride 1000 ml @ 125 mls/hr Iv.





2200: The patient was evaluated in room B3B. A complete history and physical 

exam was performed.





2203: Ordered DuoNeb 3ml INH.





2355: Patient reassessed and still feeling ill.  Discussed further treatment in 

the hospital.  Ordering Levaquin.





0015: Paged Hospitalist


 


0020: Discussed with Dr. Varghese.





Medical Decision


Triage Nursing notes reviewed.


The patient's presentation and history were concerning for flulike symptoms.  





Etiologies such as viral syndrome, otitis, pharyngitis, pneumonia, urinary 

tract infection, sepsis, bacteremia, meningitis, as well as others were 

entertained.  





The patient was evaluated.  He had tachycardia, increased work of breathing, 

and fever.  There was concerns about sepsis as well as pneumonia.  DuoNeb was 

provided.  The patient was given Tylenol.  IV fluids were initiated.  His CBC 

and chemistry panel were unremarkable.  Cardiac markers are negative.  ECG is 

consistent with tachycardia.  Nonspecific ST changes noted.  The patient did 

feel somewhat better after the nebulizer and Tylenol.  He was still mildly 

tachycardic.  Chest x-ray raise some concern about pneumonia as he has a 

productive cough of yellow sputum.  The patient had a flu test performed and 

this was positive for influenza B.  The patient was given a second neb 

treatment.  On reassessment he was still having increased work of breathing and 

felt poorly.  I discuss further evaluation and management in the hospital or 

going home.  The patient felt comfortable staying in the hospital.  He was 

given a dose of IV Levaquin consultation was made with [facility] 

Medicine.  The patient was evaluated for further treatment.





The chart was completed utilizing Dragon Speech voice recognition software.   

Grammatical errors, random word insertions, pronoun errors, and incomplete 

sentences are an occasional consequence of this system due to software 

limitations, ambient noise, and hardware issues.  Any formal questions or 

concerns about the content, text, or information contained within the body of 

this dictation should be directly addressed to the physician for clarification.





Consults


Time Called:  0015


Consulting Physician:  Dr. Varghese


Returned Call:  0020





Impression





 Primary Impression:  


 Influenza


 Additional Impression:  


 Pneumonia





Scribe Attestation


The scribe's documentation has been prepared under my direction and personally 

reviewed by me in its entirety. I confirm that the note above accurately 

reflects all work, treatment, procedures, and medical decision making performed 

by me.





Departure Information


Dispostion


Being Evaluated By Hospitalist





Referrals


Jesus Paulson D.O. (PCP)





Patient Instructions


My [facility]





Problem Qualifiers

## 2017-05-26 NOTE — PROGRESS NOTE
Internal Med Progress Note


Date of Service:


May 26, 2017.


Provider Documentation:





SUBJECTIVE:





has headaches


has cough


feels chest tight for short period of times


afebrile


nausea improved


appetite better


overall feeling better than yesterday








OBJECTIVE:





Vital Signs-as noted below





Exam:


General-alert and awake and oriented x 3


ENT-normal hearing


Neck-no neck masses


Lungs-cta b/l mild b/l wheezing present no crackles


Heart-s1 and s2 heard regular rate and rhythm no murmurs


Abdomen-soft bowel sounds present non tender no distension 


Extremities- no edema present no erythema


Neuro-alert and awake oriented x 3


moves extremities


Lab data as noted below.


ASSESSMENT & PLAN:


1.  Bibasilar pneumonia with influenza B.  


on Tamiflu and Levaquin


improving slowly


continue same


will monitor.





2.  Epilepsy. On phenytoin.  stable.





3.  Esophageal reflux,  on omeprazole.  





4.  Asthma in remission, does not use any medications, has minimal wheezing. 


Will give nebulized bronchodilator while in the hospital and also iv 

steroid..Stable





5.  Deep venous thrombosis prophylaxis with subcutaneous heparin.





6.  Gastrointestinal prophylaxis with omeprazole.





7.  Code status.  He will be a full code.








DISPOSITION


to be determined


Vital Signs:











  Date Time  Temp Pulse Resp B/P Pulse Ox O2 Delivery O2 Flow Rate FiO2


 


5/26/17 16:00      Room Air  


 


5/26/17 15:10 36.7 69 20 105/71 96 Room Air  


 


5/26/17 09:25 36.8       


 


5/26/17 08:00     92 Room Air  


 


5/26/17 07:46 37.6 76 18 95/65 92 Room Air  


 


5/26/17 00:00     91 Room Air  


 


5/25/17 23:52 36.9 86 16 103/63 91 Room Air  


 


5/25/17 22:38  90 14  91 Room Air  


 


5/25/17 20:00     91 Room Air  








Lab Results:





Results Past 24 Hours








Test


  5/26/17


00:00 5/26/17


05:44 5/26/17


11:52 Range/Units


 


 


Total Creatine Kinase 73 71 80   U/L


 


Creatine Kinase MB 1.0 0.6 1.0 0.5-3.6  ng/ml


 


Creatine Kinase MB Ratio 1.4 0.8 1.3 0-3.0  


 


Troponin I < 0.015 < 0.015 < 0.015 0-0.045  ng/ml








 Microbiology Results


5/26/17 MRSA DNA Surveillance Screen, Received


          Pending

## 2017-05-27 NOTE — PROGRESS NOTE
Internal Med Progress Note


Date of Service:


May 27, 2017.


Provider Documentation:





SUBJECTIVE:





still has cough but sputum is getting thinner


afebrile


sob improving


eating ok


had 2-3 bowel movements today











OBJECTIVE:





Vital Signs-as noted below





Exam:


General-alert and awake and oriented x 3


ENT-normal hearing


Neck-no neck masses


Lungs-cta b/l mild b/l wheezing present no crackles


Heart-s1 and s2 heard regular rate and rhythm no murmurs


Abdomen-soft bowel sounds present non tender no distension 


Extremities- no edema present no erythema


Neuro-alert and awake oriented x 3


moves extremities


Lab data as noted below.


ASSESSMENT & PLAN:


1.  Bibasilar pneumonia with influenza B.  


on Tamiflu and Levaquin#3


improving slowly


continue same


will monitor.





2.  Epilepsy. On phenytoin.  stable.





3.  Esophageal reflux,  on omeprazole.  





4.  Asthma in remission, does not use any medications, has minimal wheezing. 


Will give nebulized bronchodilator while in the hospital and also iv 

steroid..Stable


will change to po steroid in am





5.  Deep venous thrombosis prophylaxis with subcutaneous heparin.





6.  Gastrointestinal prophylaxis with omeprazole.





7.  Code status.  He will be a full code.








DISPOSITION


to be determined


Vital Signs:











  Date Time  Temp Pulse Resp B/P Pulse Ox O2 Delivery O2 Flow Rate FiO2


 


5/27/17 16:00      Room Air  


 


5/27/17 15:27 36.6 66 20 106/70 94 Room Air  


 


5/27/17 07:45     96 Room Air  


 


5/27/17 07:18 36.8 67 16 113/71 96 Room Air  


 


5/27/17 00:00      Room Air  


 


5/26/17 23:32 36.9 76 20 129/76 95 Room Air

## 2017-05-28 NOTE — PROGRESS NOTE
Internal Med Progress Note


Date of Service:


May 28, 2017.


Provider Documentation:





SUBJECTIVE:





still has cough but sputum is getting much thinner


afebrile


sob much better


cough is getting better


appetite poor


ambulated in room ok











OBJECTIVE:





Vital Signs-as noted below





Exam:


General-alert and awake and oriented x 3


ENT-normal hearing


Neck-no neck masses


Lungs-cta b/l mild b/l wheezing present no crackles


Heart-s1 and s2 heard regular rate and rhythm no murmurs


Abdomen-soft bowel sounds present non tender no distension 


Extremities- no edema present no erythema


Neuro-alert and awake oriented x 3


moves extremities


Lab data as noted below.


ASSESSMENT & PLAN:


1.  Bibasilar pneumonia with influenza B.  


on Tamiflu and Levaquin#4


improving slowly


continue same


will monitor.





2.


Neutropenia


mostly from above


neutropenia precautions


f/u labs








3.  Epilepsy. On phenytoin.  stable.





4.  Esophageal reflux,  on omeprazole.  





5.  Asthma in remission, does not use any medications, has minimal wheezing. 


Will give nebulized bronchodilator while in the hospital and also iv 

steroid..Stable


will change to po steroid in am. stable





6.  Deep venous thrombosis prophylaxis with subcutaneous heparin.


changed  to scds and ambulation





7.  Gastrointestinal prophylaxis with omeprazole.





8.  Code status.  He will be a full code.








DISPOSITION


possible d/c in 2-3 days


Vital Signs:











  Date Time  Temp Pulse Resp B/P Pulse Ox O2 Delivery O2 Flow Rate FiO2


 


5/28/17 16:00      Room Air  


 


5/28/17 15:14 36.4 52 18 131/86 94 Room Air  


 


5/28/17 07:45     94 Room Air  


 


5/28/17 07:15  77 18  94 Room Air  


 


5/28/17 07:02 36.8 64 20 121/79 94 Room Air  


 


5/28/17 00:01 36.7 69 20 102/66 94 Room Air  


 


5/27/17 23:20      Room Air  


 


5/27/17 20:32  88 18  95 Room Air  








Lab Results:





Results Past 24 Hours








Test


  5/28/17


05:44 Range/Units


 


 


White Blood Count 2.03 4.8-10.8  K/uL


 


Red Blood Count 4.34 4.7-6.1  M/uL


 


Hemoglobin 13.5 14.0-18.0  g/dL


 


Hematocrit 38.7 42-52  %


 


Mean Corpuscular Volume 89.2   fL


 


Mean Corpuscular Hemoglobin 31.1 25-34  pg


 


Mean Corpuscular Hemoglobin


Concent 34.9


  32-36  g/dl


 


 


Platelet Count 120 130-400  K/uL


 


Mean Platelet Volume 9.2 7.4-10.4  fL


 


Neutrophils (%) (Auto) 23.2  %


 


Lymphocytes (%) (Auto) 57.6  %


 


Monocytes (%) (Auto) 18.2  %


 


Eosinophils (%) (Auto) 0.0  %


 


Basophils (%) (Auto) 0.5  %


 


Neutrophils # (Auto) 0.47 1.4-6.5  K/uL


 


Lymphocytes # (Auto) 1.17 1.2-3.4  K/uL


 


Monocytes # (Auto) 0.37 0.11-0.59  K/uL


 


Eosinophils # (Auto) 0.00 0-0.5  K/uL


 


Basophils # (Auto) 0.01 0-0.2  K/uL


 


RDW Standard Deviation 43.9 36.4-46.3  fL


 


RDW Coefficient of Variation 13.3 11.5-14.5  %


 


Immature Granulocyte % (Auto) 0.5  %


 


Immature Granulocyte # (Auto) 0.01 0.00-0.02  K/uL


 


Sodium Level 145 136-145  mmol/L


 


Potassium Level 4.3 3.5-5.1  mmol/L


 


Chloride Level 110   mmol/L


 


Carbon Dioxide Level 29 21-32  mmol/L


 


Anion Gap 6.0 3-11  mmol/L


 


Blood Urea Nitrogen 9 7-18  mg/dl


 


Creatinine


  0.72


  0.60-1.40


mg/dl


 


Est Creatinine Clear Calc


Drug Dose 102.9


   ml/min


 


 


Estimated GFR (


American) 115.9


   


 


 


Estimated GFR (Non-


American 100.0


   


 


 


BUN/Creatinine Ratio 12.6 10-20  


 


Random Glucose 94 70-99  mg/dl


 


Calcium Level 8.0 8.5-10.1  mg/dl


 


Magnesium Level 2.0 1.8-2.4  mg/dl








 Microbiology Results


5/28/17 C.difficile Toxin B Gene (PCR) - Final, Complete


          No C. difficile toxin B gene detected

## 2017-05-28 NOTE — DIAGNOSTIC IMAGING REPORT
CHEST ONE VIEW PORTABLE



CLINICAL HISTORY: Congestion. Pneumonia.    



COMPARISON STUDY:  Chest radiograph March 24, 2017.



FINDINGS: There is no pneumothorax or pleural effusion. Cardiac size is normal.

Mediastinal contours are normal. Bibasilar opacities have resolved. No evidence

of pulmonary edema. 



IMPRESSION:  Resolution of bibasilar opacities. 









Electronically signed by:  Cruz Deluna M.D.

5/28/2017 7:08 AM



Dictated Date/Time:  5/28/2017 7:08 AM

## 2017-05-29 NOTE — PROGRESS NOTE
Internal Med Progress Note


Date of Service:


May 29, 2017.


Provider Documentation:





SUBJECTIVE:





sob and cough getting better


ambulated fine


no diarrhea today


afebrile


eating ok











OBJECTIVE:





Vital Signs-as noted below





Exam:


General-alert and awake and oriented x 3


ENT-normal hearing


Neck-no neck masses


Lungs-cta b/l mild b/l wheezing present no crackles


Heart-s1 and s2 heard regular rate and rhythm no murmurs


Abdomen-soft bowel sounds present non tender no distension 


Extremities- no edema present no erythema


Neuro-alert and awake oriented x 3


moves extremities


Lab data as noted below.


ASSESSMENT & PLAN:


1.  Bibasilar pneumonia with influenza B.  


on Tamiflu and Levaquin#5


improving slowly


continue same


if stable possible d/c in am





2.


Neutropenia


mostly from above


neutropenia precautions


improving


f/u labs








3.  Epilepsy. On phenytoin.  stable.





4.  Esophageal reflux,  on omeprazole.  





5.  Asthma in remission, does not use any medications, has minimal wheezing. 


Will give nebulized bronchodilator while in the hospital and also iv 

steroid..Stable


changed to po steroid today. stable





6.  Deep venous thrombosis prophylaxis with subcutaneous heparin.


changed  to scds and ambulation





7.  Gastrointestinal prophylaxis with omeprazole.





8.  Code status.   full code.








DISPOSITION


possible d/c in  am if stable


Vital Signs:











  Date Time  Temp Pulse Resp B/P Pulse Ox O2 Delivery O2 Flow Rate FiO2


 


5/29/17 16:00     94 Room Air  


 


5/29/17 14:57 36.7 72 18 112/76 94 Room Air  


 


5/29/17 14:26  80 18  96 Room Air  


 


5/29/17 08:00     96 Room Air  


 


5/29/17 07:07 36.4 63 20 124/81 96 Room Air  


 


5/29/17 00:00      Room Air  


 


5/28/17 23:15 36.8 67 20 106/75 93 Room Air  


 


5/28/17 19:14  70 18  94 Room Air  








Lab Results:





Results Past 24 Hours








Test


  5/29/17


15:35 Range/Units


 


 


White Blood Count 2.86 4.8-10.8  K/uL


 


Red Blood Count 4.46 4.7-6.1  M/uL


 


Hemoglobin 14.1 14.0-18.0  g/dL


 


Hematocrit 39.6 42-52  %


 


Mean Corpuscular Volume 88.8   fL


 


Mean Corpuscular Hemoglobin 31.6 25-34  pg


 


Mean Corpuscular Hemoglobin


Concent 35.6


  32-36  g/dl


 


 


Platelet Count 130 130-400  K/uL


 


Mean Platelet Volume 8.6 7.4-10.4  fL


 


RDW Standard Deviation 43.9 36.4-46.3  fL


 


RDW Coefficient of Variation 13.4 11.5-14.5  %


 


Neutrophils % (Manual) 39.2  %


 


Lymphocytes % (Manual) 33.0  %


 


Variant Lymphocytes % (manual) 18.3  %


 


Monocytes % (Manual) 7.8  %


 


Basophils % (Manual) 1.7 0-2  %


 


Neutrophils # (Manual) 1.12 1.4-6.5  K/uL


 


Total Absolute Neutrophils 1.12 1.4-6.5  K/uL


 


Lymphocytes # (Manual) 0.94 1.2-3.4  K/uL


 


Absolute Variant Lymphocytes 0.52  K/uL


 


Total Absolute Lymphocytes 1.47 1.2-3.4  K/uL


 


Monocytes # (Manual) 0.22 0.11-0.59  K/uL


 


Basophils # (Manual) 0.05 0-0.2  K/uL


 


Red Blood Cell Morphology Unremarkable

## 2017-05-30 NOTE — DISCHARGE INSTRUCTIONS
Discharge Instructions


Date of Service


May 30, 2017.





Admission


Reason for Admission:  Influenza B, Pneumonia





Discharge


Discharge Diagnosis / Problem:  INFLUENZA B, PNEUMONIA





Discharge Goals


Goal(s):  Decrease discomfort, Improve function





Activity Recommendations


Activity Limitations:  resume your previous activity





.





Instructions / Follow-Up


Instructions / Follow-Up


FOLLOWUP WITH FAMILY DOCTOR Jseus Jerome ON JUNE 1ST AT 2:45PM





Current Hospital Diet


Patient's current hospital diet: Regular Diet





Discharge Diet


Recommended Diet:  Regular Diet





Pending Studies


Studies pending at discharge:  no





Medical Emergencies








.


Who to Call and When:





Medical Emergencies:  If at any time you feel your situation is an emergency, 

please call 911 immediately.





.





Non-Emergent Contact


Non-Emergency issues call your:  Primary Care Provider





.


.





"Provider Documentation" section prepared by Marcus Amaral.








.





VTE Core Measure


Inpt VTE Proph given/why not?:  Unfractionated heparin SQ (REFUSED), SCD's

## 2017-05-30 NOTE — DISCHARGE SUMMARY
Discharge Summary


Date of Service


May 30, 2017.





Discharge Summary


Admission Date:


May 25, 2017 at 00:50


Discharge Date:  May 30, 2017


Discharge Disposition:  Home


Principal Diagnosis:


INFLUENZA B 


PNEUMONIA


Secondary Diagnoses/Problems:


epilepsy, esophageal reflux, asthma in remission,


chronic back pain,


Procedures:


CXR:


 Bibasilar opacities which statistically reflect atelectasis on this


hypoventilatory study. Consolidation is considered less likely.





Medication Reconciliation


New Medications:  


Albuterol Hfa (Ventolin Hfa) 200 Puffs/02941 Mcg Aers


2 PUFFS INH Q4 PRN for SOB/Wheezing, #1 INHALER 1 Refill





Benzonatate (Tessalon Perles) 200 Mg Cap


200 MG PO TID PRN for Cough, #30 CAP





Levofloxacin (Levaquin) 750 Mg Tab


1 TAB PO DAILY for 2 Days, #2 TAB





Prednisone Tab (Prednisone) 10 Mg Tab


40 MG PO UD, #20 TAB


PREDNSIONE 40MG PO DAILY X 2 DAYS THEN


 PREDNSIONE 30MG PO DAILY X 2 DAYS THEN


 PREDNSIONE 20MG PO DAILY X 2 DAYS THEN


 PREDNSIONE 10MG PO DAILY X 2 DAYS THEN STOP.


 


 


Continued Medications:  


Cyanocobalamin (B12) 1,000 Mcg Tab


1 TAB PO DAILY





Folic Acid (Folvite) 1 Mg Tab


1 TAB PO DAILY for 90 Days, #90 TAB 1 Refill





Omeprazole (Prilosec) 20 Mg Capcr


20 MG PO DAILY, CAP





Phenytoin Sodium Extended (Dilantin) 30 Mg Cap


200 MG PO BID, CAP











Admission Information


HPI (per Admitting provider):


He is a 62-year-old male with significant past


medical history of epilepsy, esophageal reflux, asthma in remission and also


chronic back pain, apparently has been complaining of cough with weakness and


fever since Monday midnight.  He woke up at midnight Monday with cough.  At


that time, he was feeling warm and had some cough without any production of


any phlegm.  He went to work but the condition has been getting worse. 


Today, he was really very tired, very warm and sweaty at times, more cough


and some shortness of breath.  He came to Emergency Room for a checkup.  He


was noted to have a fever of 38.8 with a pulse rate of 120 initially and


normal saturation, and he was positive for influenza B and also x-ray did


show bibasilar infiltration.  From that point, blood culture was taken and he


was started with intravenous Levaquin and also oral Tamiflu and was advised


admission.


 


When asking question, he mentions that he has been in [place] recently and


while in there, he had a bite by an insect on his left cheek and that was red


and just light a dime in size.  He does have some swelling in the left facial


area since then, but the bite blanca is gone.  He denies to have any chest


pain.  He does not have any shortness of breath.  He denies to have any


swelling of the legs or any joint pain.  He does not have any nausea or


vomiting.  He does not have any headache or any blurred vision, or any


numbness or tingling in the extremities.


Physical Exam (per Admitting):


GENERAL:  On examination in the Emergency Room, he was not having any acute


distress except some weakness and cough, nonproductive.


HEENT:  Unremarkable.


VITAL SIGNS:  Temperature 38.8, pulse was 107, blood pressure 126/78,


saturation 96% on 2 liters nasal cannula.


HEENT:  Unremarkable.


NECK:  Supple.  No JVD, no bruit, no lymphadenopathy.


CHEST:  Occasional crackles at the bases.


HEART:  S1, S2 regular, no murmur.


ABDOMEN:  Soft, benign, nontender, no organomegaly.  Bowel sounds present.


EXTREMITIES:  Negative for any edema.


MUSCULOSKELETAL:  Did not show any acute arthritis.


CENTRAL NERVOUS SYSTEM:  Alert, awake, oriented x3.  No focal sensory and/or


motor deficit appreciated.





Hospital Course


1.  Bibasilar pneumonia with influenza B.  


on Tamiflu and Levaquin#5


improving slowly


continue same


finished Tamiflu course


d/c on couple more days of Levaquin


f/u with pcp.





2.


Neutropenia


mostly from above


neutropenia precautions


improved











3.  Epilepsy. On phenytoin.  stable.





4.  Esophageal reflux,  on omeprazole.  





5.  Asthma in remission, does not use any medications, has minimal wheezing. 


Will give nebulized bronchodilator while in the hospital and also iv 

steroid..Stable


changed to po steroid taper. f/u with pcp





Discharged home


Total time spent on discharge = 35MINUTES


This includes examination of the patient, discharge planning, medication 

reconciliation, and communication with other providers.





Discharge Instructions


                                           Please take this sheet to every 

appointment for the next month


Discharge Instructions


Date of Service


May 30, 2017.





Admission


Reason for Admission:  Influenza B, Pneumonia





Discharge


Discharge Diagnosis / Problem:  INFLUENZA B, PNEUMONIA





Discharge Goals


Goal(s):  Decrease discomfort, Improve function





Activity Recommendations


Activity Limitations:  resume your previous activity





.





Instructions / Follow-Up


Instructions / Follow-Up


FOLLOWUP WITH FAMILY DOCTOR Jesus Jerome ON JUNE 1ST AT 2:45PM





Current Hospital Diet


Patient's current hospital diet: Regular Diet





Discharge Diet


Recommended Diet:  Regular Diet





Pending Studies


Studies pending at discharge:  no





Medical Emergencies








.


Who to Call and When:





Medical Emergencies:  If at any time you feel your situation is an emergency, 

please call 911 immediately.





.





Non-Emergent Contact


Non-Emergency issues call your:  Primary Care Provider





.


.





"Provider Documentation" section prepared by Marcus Amaral.








.





VTE Core Measure


Inpt VTE Proph given/why not?:  Unfractionated heparin SQ (REFUSED), SCD's

## 2017-05-30 NOTE — PROGRESS NOTE
Internal Med Progress Note


Date of Service:


May 30, 2017.


Provider Documentation:





SUBJECTIVE


denies sob


cough much better


afebrile


ambulating fine


no chest pain


ok for discharge.








OBJECTIVE:





Vital Signs-as noted below





Exam:


General-alert and awake and oriented x 3


ENT-normal hearing


Neck-no neck masses


Lungs-cta b/l mild b/l wheezing present no crackles


Heart-s1 and s2 heard regular rate and rhythm no murmurs


Abdomen-soft bowel sounds present non tender no distension 


Extremities- no edema present no erythema


Neuro-alert and awake oriented x 3


moves extremities


Lab data as noted below.


ASSESSMENT & PLAN:


1.  Bibasilar pneumonia with influenza B.  


on Tamiflu and Levaquin#5


improving slowly


continue same


finished Tamiflu course


d/c on couple more days of Levaquin


f/u with pcp.





2.


Neutropenia


mostly from above


neutropenia precautions


improved











3.  Epilepsy. On phenytoin.  stable.





4.  Esophageal reflux,  on omeprazole.  





5.  Asthma in remission, does not use any medications, has minimal wheezing. 


Will give nebulized bronchodilator while in the hospital and also iv 

steroid..Stable


changed to po steroid taper. f/u with pcp





Discharged home


Vital Signs:











  Date Time  Temp Pulse Resp B/P Pulse Ox O2 Delivery O2 Flow Rate FiO2


 


5/30/17 08:00     93 Room Air  


 


5/30/17 07:34 36.6 62 18 122/75 94 Room Air  


 


5/30/17 00:00      Room Air  


 


5/29/17 23:05 36.9 68 16 127/80 91 Room Air  


 


5/29/17 16:00     94 Room Air  


 


5/29/17 14:57 36.7 72 18 112/76 94 Room Air  


 


5/29/17 14:26  80 18  96 Room Air  








Lab Results:





Results Past 24 Hours








Test


  5/29/17


15:35 5/30/17


06:26 Range/Units


 


 


White Blood Count 2.86 3.36 4.8-10.8  K/uL


 


Red Blood Count 4.46 4.36 4.7-6.1  M/uL


 


Hemoglobin 14.1 13.8 14.0-18.0  g/dL


 


Hematocrit 39.6 38.4 42-52  %


 


Mean Corpuscular Volume 88.8 88.1   fL


 


Mean Corpuscular Hemoglobin 31.6 31.7 25-34  pg


 


Mean Corpuscular Hemoglobin


Concent 35.6


  35.9


  32-36  g/dl


 


 


Platelet Count 130 131 130-400  K/uL


 


Mean Platelet Volume 8.6 9.3 7.4-10.4  fL


 


RDW Standard Deviation 43.9 42.6 36.4-46.3  fL


 


RDW Coefficient of Variation 13.4 13.2 11.5-14.5  %


 


Neutrophils % (Manual) 39.2   %


 


Lymphocytes % (Manual) 33.0   %


 


Variant Lymphocytes % (manual) 18.3   %


 


Monocytes % (Manual) 7.8   %


 


Basophils % (Manual) 1.7  0-2  %


 


Neutrophils # (Manual) 1.12  1.4-6.5  K/uL


 


Total Absolute Neutrophils 1.12  1.4-6.5  K/uL


 


Lymphocytes # (Manual) 0.94  1.2-3.4  K/uL


 


Absolute Variant Lymphocytes 0.52   K/uL


 


Total Absolute Lymphocytes 1.47  1.2-3.4  K/uL


 


Monocytes # (Manual) 0.22  0.11-0.59  K/uL


 


Basophils # (Manual) 0.05  0-0.2  K/uL


 


Red Blood Cell Morphology Unremarkable   


 


Neutrophils (%) (Auto)  38.9  %


 


Lymphocytes (%) (Auto)  43.8  %


 


Monocytes (%) (Auto)  15.5  %


 


Eosinophils (%) (Auto)  0.6  %


 


Basophils (%) (Auto)  0.6  %


 


Neutrophils # (Auto)  1.31 1.4-6.5  K/uL


 


Lymphocytes # (Auto)  1.47 1.2-3.4  K/uL


 


Monocytes # (Auto)  0.52 0.11-0.59  K/uL


 


Eosinophils # (Auto)  0.02 0-0.5  K/uL


 


Basophils # (Auto)  0.02 0-0.2  K/uL


 


Immature Granulocyte % (Auto)  0.6  %


 


Immature Granulocyte # (Auto)  0.02 0.00-0.02  K/uL

## 2018-01-22 ENCOUNTER — HOSPITAL ENCOUNTER (INPATIENT)
Dept: HOSPITAL 45 - C.EDB | Age: 63
LOS: 2 days | Discharge: HOME | DRG: 203 | End: 2018-01-24
Attending: HOSPITALIST | Admitting: FAMILY MEDICINE
Payer: COMMERCIAL

## 2018-01-22 VITALS
TEMPERATURE: 98.6 F | SYSTOLIC BLOOD PRESSURE: 145 MMHG | DIASTOLIC BLOOD PRESSURE: 84 MMHG | HEART RATE: 80 BPM | OXYGEN SATURATION: 95 %

## 2018-01-22 VITALS
HEIGHT: 68 IN | BODY MASS INDEX: 27.2 KG/M2 | HEIGHT: 68 IN | WEIGHT: 179.46 LBS | BODY MASS INDEX: 27.2 KG/M2 | WEIGHT: 179.46 LBS

## 2018-01-22 VITALS
HEART RATE: 86 BPM | TEMPERATURE: 97.88 F | SYSTOLIC BLOOD PRESSURE: 126 MMHG | OXYGEN SATURATION: 95 % | DIASTOLIC BLOOD PRESSURE: 85 MMHG

## 2018-01-22 VITALS — OXYGEN SATURATION: 95 %

## 2018-01-22 VITALS — OXYGEN SATURATION: 96 % | HEART RATE: 68 BPM

## 2018-01-22 DIAGNOSIS — J45.909: Primary | ICD-10-CM

## 2018-01-22 DIAGNOSIS — Z82.49: ICD-10-CM

## 2018-01-22 DIAGNOSIS — K21.9: ICD-10-CM

## 2018-01-22 DIAGNOSIS — G40.909: ICD-10-CM

## 2018-01-22 DIAGNOSIS — Z87.01: ICD-10-CM

## 2018-01-22 DIAGNOSIS — J44.9: ICD-10-CM

## 2018-01-22 DIAGNOSIS — I45.10: ICD-10-CM

## 2018-01-22 DIAGNOSIS — E87.6: ICD-10-CM

## 2018-01-22 LAB
BASOPHILS # BLD: 0.05 K/UL (ref 0–0.2)
BASOPHILS NFR BLD: 0.7 %
BUN SERPL-MCNC: 17 MG/DL (ref 7–18)
CALCIUM SERPL-MCNC: 8.6 MG/DL (ref 8.5–10.1)
CO2 SERPL-SCNC: 28 MMOL/L (ref 21–32)
CREAT SERPL-MCNC: 1.05 MG/DL (ref 0.6–1.4)
EOS ABS #: 0.39 K/UL (ref 0–0.5)
EOSINOPHIL NFR BLD AUTO: 151 K/UL (ref 130–400)
GLUCOSE SERPL-MCNC: 110 MG/DL (ref 70–99)
HCT VFR BLD CALC: 43.3 % (ref 42–52)
HGB BLD-MCNC: 15.5 G/DL (ref 14–18)
IG#: 0.02 K/UL (ref 0–0.02)
IMM GRANULOCYTES NFR BLD AUTO: 19.4 %
INR PPP: 1 (ref 0.9–1.1)
LYMPHOCYTES # BLD: 1.33 K/UL (ref 1.2–3.4)
MCH RBC QN AUTO: 32.2 PG (ref 25–34)
MCHC RBC AUTO-ENTMCNC: 35.8 G/DL (ref 32–36)
MCV RBC AUTO: 89.8 FL (ref 80–100)
MONO ABS #: 0.56 K/UL (ref 0.11–0.59)
MONOCYTES NFR BLD: 8.2 %
NEUT ABS #: 4.5 K/UL (ref 1.4–6.5)
NEUTROPHILS # BLD AUTO: 5.7 %
NEUTROPHILS NFR BLD AUTO: 65.7 %
PMV BLD AUTO: 9.1 FL (ref 7.4–10.4)
POTASSIUM SERPL-SCNC: 3.1 MMOL/L (ref 3.5–5.1)
PTT PATIENT: 27.1 SECONDS (ref 21–31)
RED CELL DISTRIBUTION WIDTH CV: 13.3 % (ref 11.5–14.5)
RED CELL DISTRIBUTION WIDTH SD: 43.5 FL (ref 36.4–46.3)
SODIUM SERPL-SCNC: 140 MMOL/L (ref 136–145)
WBC # BLD AUTO: 6.85 K/UL (ref 4.8–10.8)

## 2018-01-22 RX ADMIN — EXTENDED PHENYTOIN SODIUM SCH MG: 100 CAPSULE ORAL at 21:32

## 2018-01-22 RX ADMIN — HYDROCODONE BITARTRATE AND ACETAMINOPHEN PRN TAB: 10; 325 TABLET ORAL at 23:50

## 2018-01-22 RX ADMIN — GUAIFENESIN AND DEXTROMETHORPHAN PRN ML: 100; 10 SYRUP ORAL at 23:50

## 2018-01-22 RX ADMIN — LEVALBUTEROL HYDROCHLORIDE SCH MG: 1.25 SOLUTION RESPIRATORY (INHALATION) at 21:05

## 2018-01-22 RX ADMIN — AZITHROMYCIN MONOHYDRATE SCH MLS/HR: 500 INJECTION, POWDER, LYOPHILIZED, FOR SOLUTION INTRAVENOUS at 21:11

## 2018-01-22 RX ADMIN — ACETAMINOPHEN PRN MG: 325 TABLET ORAL at 21:14

## 2018-01-22 RX ADMIN — ENOXAPARIN SODIUM SCH MG: 40 INJECTION SUBCUTANEOUS at 21:33

## 2018-01-22 RX ADMIN — PANTOPRAZOLE SCH MG: 40 TABLET, DELAYED RELEASE ORAL at 21:31

## 2018-01-22 NOTE — HISTORY & PHYSICAL EXAMINATION
DATE OF ADMISSION:  01/22/2018

 

CHIEF COMPLAINT:  Shortness of breath and cough.

 

HISTORY OF PRESENT ILLNESS:  This is a 62-year-old male with past medical

history significant for epilepsy, GERD, right bundle branch block, asthma,

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

shortness of breath and cough.  The patient was seen in the hospital in

 May 25 to May 30 of last year with influenza B.  He states that

since then he is still feeling his cough did not go away and in December he saw 
his family doctor and was treated with augmentin course  of steroids  for his 
bronchitis, but he says his symptoms are not getting better, in last week

it is getting worse, minimal activities making short of breath and lot of

cough, mostly dry, once in a while with whitish phlegm.  Denies any fevers,

no runny nose, no earache, no sore throat, no difficulty swallowing.  No 

body aches, no flu-like symptoms.  He  has some chest pain because of

cough.  Denies any abdominal pain, no nausea, no vomiting, no diarrhea, no

constipation, no blood in the stools.  No blood in the urine.  Normal bladder

movements.  Appetite is okay.  No swelling in the legs.  Currently resting

comfortably and hemodynamically stable, but on and off he gets coughing

spells.

 

ALLERGIES:  MORPHINE.

 

PAST MEDICAL HISTORY:  As mentioned above.

 

PAST SURGICAL HISTORY:  Colonoscopy repair with nasal septum, laparoscopic

hernia repair.

 

MEDICATIONS AT HOME:  The patient is on Combivent 1 puff 4 times a day, 
Phenytoin ER

200 mg p.o. b.i.d., omeprazole 20 mg p.o. b.i.d., folic acid 1 mg p.o. daily,

hydrocodone/acetaminophen 10/325 mg 1 or 2 tablets by mouth every 6 hours

p.r.n., naproxen 500 mg p.o. b.i.d. with meals but currently not taking,

albuterol 2 puffs 4 times a day, and vitamin B12 1000 mcg p.o. daily.

 

FAMILY HISTORY:  Significant for father had history of CAD status post stent

placement.  Mother had CAD.  Paternal grandfather has throat cancer. 

Maternal grandmother had cancer.

 

SOCIAL HISTORY:  , no smoking history.  Alcohol occasional.  No drug

use.

 

REVIEW OF SYMPTOMS:  As per HPI.  Rest of review of symptoms negative.

 

PHYSICAL EXAMINATION:

GENERAL:  The patient is of moderate build, not in distress.

VITAL SIGNS:  Temperature 36.8, pulse 87, respiratory rate 20, blood pressure

142/75, oxygen 92% on room air.

HEENT:  No pallor, no icterus.  Pupils equal, round and react to light.

NECK:  No JVD, no neck masses, no carotid bruits.

CARDIOVASCULAR:  S1, S2 heard, regular rate and rhythm, no murmur, no gallop.

RESPIRATORY SYSTEM:  Normal AP diameter.  No accessory muscle use.  With

coughing and taking deep breath, has mild rhonchi bilaterally.  No crackles.

ABDOMEN:  Soft, bowel sounds present.  Nontender.  No distention.

CENTRAL NERVOUS SYSTEM:  Cranial nerves II-XII grossly intact.  Nonfocal.

EXTREMITIES:  No edema, no erythema.

 

LABORATORY DATA:  WBC 6.8, hemoglobin 15.5, hematocrit 43.3, and platelets

151.  Sodium 140, potassium 3.1, chloride 107, bicarbonate 28, BUN 17,

creatinine 1, serum glucose 110, and calcium 8.6.  Troponin less than 0.015. 

BNP 85.  PT 10.5, INR 1, APTT 27.1.

 

IMAGING DATA:  Chest x-ray:  No acute process seen.

 

EKG:  Shows normal sinus rhythm with rate of 60, right bundle branch block

was seen.  No significant change from previous EKG.

 

ASSESSMENT AND PLAN:  This is a 62-year-old male who presents with ongoing

shortness breath and cough.

1.  Persistent cough and shortness of breath with possible asthma

exacerbation and acute bronchitis.  The patient says since last May when he had 
FLU his

cough has not improved and last December had a flare up of his cough and

shortness of breath and  was treated was treated with Augmentin and prednisone 
course, but

since last  weak, his shortness of breath and cough are getting progressively

worsened.  Minimal activities making him short of breath.  Denies any

flu-like symptoms or fevers.  Will treat him with IV Solu-Medrol 40 b.i.d.,

IV azithromycin.  Nebs t.i.d. and p.r.n.  We will follow the CT scan. 

Consult pulmonary in the a.m. for any further recommendation.

2.  Gastroesophageal reflux disease, could be complicating his asthma. 

Currently, will continue  his Prilosec 20 mg b.i.d.

3.  History of epilepsy.  Continue home Dilantin.

4.  History of back pain.  Currently stable, pain medications as needed.

5.  Deep venous thrombosis prophylaxis, sequential compression devices and

Lovenox.

 

DISPOSITION:  Admit to tele floor.  Expect to discharge home and follow with

family doctor.  Level 1 full code.

 

 

 

MTDD

## 2018-01-22 NOTE — DIAGNOSTIC IMAGING REPORT
CHEST ONE VIEW PORTABLE



HISTORY:  62 years-old Male EVALUATE RESPIRATORY DISTRESS.DYSPNEA acute

respiratory distress with shortness of breath



COMPARISON: Chest radiograph 5/28/2017



TECHNIQUE: Portable AP view of the chest



FINDINGS: 

Cardiac mediastinal and hilar silhouettes are within normal limits. No

pneumothorax, pleural effusion, focal airspace consolidation or overt pulmonary

edema. Bones of the chest appear grossly intact.



IMPRESSION: No acute process. 







The above report was generated using voice recognition software. It may contain

grammatical, syntax or spelling errors.







Electronically signed by:  Héctor Fonseca M.D.

1/22/2018 3:26 PM



Dictated Date/Time:  1/22/2018 3:25 PM

## 2018-01-22 NOTE — DIAGNOSTIC IMAGING REPORT
(CHEST FOR PE) ANGIO WITH



CT DOSE: 369.20 mGy.cm



HISTORY: Chest pain  dyspnea



TECHNIQUE: Multiaxial CT images of the chest were performed following the

intravenous administration of contrast to evaluate the pulmonary arteries.

Maximal intensity projection images were also obtained.  A dose lowering

technique was utilized adhering to the principles of ALARA.





COMPARISON STUDY: None.



FINDINGS: Moderate ectasia thoracic aorta. No evidence for true aneurysm or

dissection. Moderate respiratory and somatic motion limiting resolution of the

peripheral third order vessels. The study is positive for a filling defect

involving the peripheral right lower lobe pulmonary arterial vasculature. This

is best seen on transaxial image 103 of 278. Potential additional filling defect

of the peripheral left lower lobe pulmonary tear vasculature.



No evidence for major central pulmonary embolus. Slight interstitial and

peribronchial prominence throughout both hemithoraces. No well-defined

consolidative infiltrate.



IMPRESSION: 



1. Study is positive for at least 2 small filling defects of the peripheral

right as well as left lower lobe pulmonary vasculature.

2. No evidence for main or central pulmonary embolus.

3. Slight parenchymal and peribronchial prominence throughout both hemithoraces.













The above report was generated using voice recognition software.  It may contain

grammatical, syntax or spelling errors.







Electronically signed by:  Mikey Tse M.D.

1/22/2018 6:39 PM



Dictated Date/Time:  1/22/2018 6:35 PM

## 2018-01-22 NOTE — EMERGENCY ROOM VISIT NOTE
History


Report prepared by Pedro:  Juan Antonio Phelan


Under the Supervision of:  Dr. Kd Sanford M.D.


First contact with patient:  14:47


Chief Complaint:  SHORTNESS OF BREATH


Stated Complaint:  SOB,PCP REFFERED


Nursing Triage Summary:  


Pt reports seen at [facility] in [place] for difficulty breathing, cough of 


clear mucous. Sx since before Christmas. Dx 12/23 with bronchitis, hx of 


pneumonia. States bronchitis never fully resolved.





History of Present Illness


The patient is a 62 year old white male with a past medical history of seizures

, reflux, and asthma who presents to the ED with a cc of worsening shortness of 

breath beginning since the end of December which is worse with walking, though 

not with laying flat. Positive chest discomfort and a productive cough that 

brings up clear mucous. Negative hemoptysis, sore throat, nausea, vomiting, 

sweating, leg pain, and blood thinner use. The patient reports that he had 

pneumonia in the summer, and then he got bronchitis in the end of December. He 

was given antibiotics and steroids, and he states that he has not gotten better 

since then. He has used his inhaler, and this helps his shortness of breath. 

The patient does not smoke.





   Source of History:  patient


   Onset:  the end of December


   Position:  other (global)


   Quality:  other (shortness of breath)


   Timing:  worsening


   Modifying Factors (Worsening):  other (walking)


   Modifying Factors (Relieving):  other (inhaler)


   Associated Symptoms:  + cough, No sorethroat, No nausea, No vomiting


Note:


Associated symptoms: Chest discomfort





Review of Systems


See HPI for pertinent positives and negatives.  A total of ten systems were 

reviewed and were otherwise negative.





Past Medical & Surgical


Medical Problems:


(1) Acute bronchitis


(2) GERD (gastroesophageal reflux disease)


(3) Influenza B


(4) Pneumonia


(5) Seizure disorder








Family History





FH: heart disease


FHx: gallbladder disease


Hypertension





Social History


Smoking Status:  Never Smoker


Alcohol Use:  none


Occupation Status:  employed





Current/Historical Medications


Scheduled


Cyanocobalamin (B12), 1 TAB PO DAILY


Folic Acid (Folvite), 1 TAB PO DAILY


Omeprazole (Prilosec), 20 MG PO DAILY


Phenytoin Sodium Extended (Dilantin), 200 MG PO BID





Scheduled PRN


Albuterol Hfa (Ventolin Hfa), 2 PUFFS INH Q4 PRN for SOB/Wheezing





Allergies


Coded Allergies:  


     Morphine (Unverified  Allergy, Unknown, IV, RED STREAKS UP ARM, 1/22/18)





Physical Exam


Vital Signs











  Date Time  Temp Pulse Resp B/P (MAP) Pulse Ox O2 Delivery O2 Flow Rate FiO2


 


1/22/18 17:50  87 20  92 Room Air  


 


1/22/18 16:16  82 20 142/75 92 Room Air  


 


1/22/18 15:36  71 30 128/88 99 Mask 6.0 


 


1/22/18 15:26  76      


 


1/22/18 15:23     97 Room Air  


 


1/22/18 15:18     99 Room Air  


 


1/22/18 14:24     97 Room Air  


 


1/22/18 14:24 36.8 73 20 139/89 97 Room Air  











Physical Exam


GENERAL: Awake, alert, well-appearing, NAD, non-toxic


HENT: Normocephalic, atraumatic.


EYES: Normal conjunctiva. Sclera non-icteric.


NECK: Supple. No nuchal rigidity. FROM. No stridor.


RESPIRATORY: CTAB, no rhonchi, wheezing, crackles


CARDIAC: RRR, no MRG


ABDOMEN: Soft, NTND, BS+


MSK: No chest wall TTP, no LE edema


NEURO: GCS 15, CN 2-12 intact, moves all 4s on command


SKIN: Scant bruising to the left shin. No rash or jaundice noted.





Medical Decision & Procedures


ER Provider


Diagnostic Interpretation:


Radiology results as stated below per my review and radiologist interpretation: 








CHEST ONE VIEW PORTABLE





HISTORY:  62 years-old Male EVALUATE RESPIRATORY DISTRESS.DYSPNEA acute


respiratory distress with shortness of breath





COMPARISON: Chest radiograph 5/28/2017





TECHNIQUE: Portable AP view of the chest





FINDINGS: 


Cardiac mediastinal and hilar silhouettes are within normal limits. No


pneumothorax, pleural effusion, focal airspace consolidation or overt pulmonary


edema. Bones of the chest appear grossly intact.





IMPRESSION: No acute process. 





The above report was generated using voice recognition software. It may contain


grammatical, syntax or spelling errors.





Electronically signed by:  Héctor Fonseca M.D.


1/22/2018 3:26 PM





Dictated Date/Time:  1/22/2018 3:25 PM





Laboratory Results


1/22/18 15:10








Red Blood Count 4.82, Mean Corpuscular Volume 89.8, Mean Corpuscular Hemoglobin 

32.2, Mean Corpuscular Hemoglobin Concent 35.8, Mean Platelet Volume 9.1, 

Neutrophils (%) (Auto) 65.7, Lymphocytes (%) (Auto) 19.4, Monocytes (%) (Auto) 

8.2, Eosinophils (%) (Auto) 5.7, Basophils (%) (Auto) 0.7, Neutrophils # (Auto) 

4.50, Lymphocytes # (Auto) 1.33, Monocytes # (Auto) 0.56, Eosinophils # (Auto) 

0.39, Basophils # (Auto) 0.05





1/22/18 15:10

















Test


  1/22/18


15:10


 


White Blood Count


  6.85 K/uL


(4.8-10.8)


 


Red Blood Count


  4.82 M/uL


(4.7-6.1)


 


Hemoglobin


  15.5 g/dL


(14.0-18.0)


 


Hematocrit 43.3 % (42-52) 


 


Mean Corpuscular Volume


  89.8 fL


()


 


Mean Corpuscular Hemoglobin


  32.2 pg


(25-34)


 


Mean Corpuscular Hemoglobin


Concent 35.8 g/dl


(32-36)


 


Platelet Count


  151 K/uL


(130-400)


 


Mean Platelet Volume


  9.1 fL


(7.4-10.4)


 


Neutrophils (%) (Auto) 65.7 % 


 


Lymphocytes (%) (Auto) 19.4 % 


 


Monocytes (%) (Auto) 8.2 % 


 


Eosinophils (%) (Auto) 5.7 % 


 


Basophils (%) (Auto) 0.7 % 


 


Neutrophils # (Auto)


  4.50 K/uL


(1.4-6.5)


 


Lymphocytes # (Auto)


  1.33 K/uL


(1.2-3.4)


 


Monocytes # (Auto)


  0.56 K/uL


(0.11-0.59)


 


Eosinophils # (Auto)


  0.39 K/uL


(0-0.5)


 


Basophils # (Auto)


  0.05 K/uL


(0-0.2)


 


RDW Standard Deviation


  43.5 fL


(36.4-46.3)


 


RDW Coefficient of Variation


  13.3 %


(11.5-14.5)


 


Immature Granulocyte % (Auto) 0.3 % 


 


Immature Granulocyte # (Auto)


  0.02 K/uL


(0.00-0.02)


 


Prothrombin Time


  10.5 SECONDS


(9.0-12.0)


 


Prothromb Time International


Ratio 1.0 (0.9-1.1) 


 


 


Activated Partial


Thromboplast Time 27.1 SECONDS


(21.0-31.0)


 


Partial Thromboplastin Ratio 1.0 


 


Anion Gap


  5.0 mmol/L


(3-11)


 


Est Creatinine Clear Calc


Drug Dose 76.6 ml/min 


 


 


Estimated GFR (


American) 87.7 


 


 


Estimated GFR (Non-


American 75.7 


 


 


BUN/Creatinine Ratio 15.9 (10-20) 


 


Calcium Level


  8.6 mg/dl


(8.5-10.1)


 


Troponin I


  < 0.015 ng/ml


(0-0.045)


 


Pro-B-Type Natriuretic Peptide


  85 pg/ml


(0-900)








Laboratory results reviewed by me





Medications Administered











 Medications


  (Trade)  Dose


 Ordered  Sig/Angélica


 Route  Start Time


 Stop Time Status Last Admin


Dose Admin


 


 Albuterol/


 Ipratropium


  (Duoneb)  9 ml  ONE  ONCE


 INH  1/22/18 15:15


 1/22/18 15:16 DC 1/22/18 15:35


9 ML


 


 Methylprednisolone


 Sodium Succinate


  (Solu-Medrol IV)  125 mg  NOW  STAT


 IV  1/22/18 15:05


 1/22/18 15:08 DC 1/22/18 15:36


125 MG


 


 Benzonatate


  (Tessalon Perles


 Cap)  100 mg  NOW  ONCE


 PO  1/22/18 17:00


 1/22/18 17:01 DC 1/22/18 17:01


100 MG











ECG


Indication:  SOB/dyspnea


Rate (beats per minute):  68


Findings:  RBBB, T-wave inversion (Anterior and inferior), left axis deviation, 

other (Wide QRS)


Comparison ECG Date:  5/26/17


Change:


RBBB, left axis deviation, and T wave inversions are old








Patient's electrocardiogram interpreted by me.





ED Course


1447: The patient was evaluated in room C4. A complete history and physical 

exam was performed.





1604: I reevaluated the patient, and the patient is going to get a walk test.





1647: I reevaluated the patient, and he was doing okay, and I am going to do a 

bedside echo.





1705: Discussed the patient's case with Dr. Amaral. The patient will be 

evaluated for further treatment and disposition.





Medical Decision


The patient is a 62 year old white male with a past medical history of seizures

, reflux, and asthma who presents to the ED with a cc of worsening shortness of 

breath beginning since the end of December which is worse with walking, though 

not with laying flat. 





Differential diagnosis:


Etiologies such as infections, reactive airway disease, pneumonia, pneumothorax

, COPD, CHF, cardiac ischemia, pulmonary embolism, musculoskeletal, 

gastrointestinal, as well as others were entertained.





Patient was seen and evaluated the bedside.  Patient has been complaining of 

some shortness of breath.  Patient states he does have some exertional dyspnea.

  Patient denies any prior history of DVT or PE.  Patient denies any 

hemoptysis.  Patient was recently diagnosed with bronchitis and the patient did 

have some prednisone as well as some Augmentin.  Patient overall is fairly well-

appearing although does describe some mild chest discomfort.  Patient denies 

any orthopnea.  Exam he does not appear volume overloaded and the patient 

sounds clear without any wheezing, rhonchi, or crackles.  Patient did have 

blood work, EKG, troponin, BNP, chest x-ray.  Patient's chest x-ray clear.  

Patient white blood cell count within normal limits.  Patient has a normal 

hemoglobin.  Patient's EKG did show a right bundle branch block and does not 

show any acute changes.  Patient troponin negative.  Patient BNP not elevated.  

I did have the patient perform an ambulatory trial.  The patient was never 

hypoxic nor tachycardic however the patient does remember mildly tachypneic 

with shallow breaths.  I did perform a bedside ultrasound.  Patient did have a 

trace pericardial effusion.  Patient's bedside echo did not seem normal to me.  

It seemed as though the aortic outflow obstructed the mitral inflow from the 

left atrium.  Given that the patient was still mildly tachypneic with which to 

me appeared to be not a normal echocardiogram I did discuss the patient with 

the hospitalist.  He did agree to further evaluate and treat the patient.  

Given the patient does not appear clinically volume overloaded and the patient 

does have a well score of 0 which makes him less likely a CHF exacerbation or 

PE respectively.  Furthermore, patient's BNP is not elevated and the patient 

does have clear chest x-ray.  Of note the patient did have mild hypokalemia 

however the patient did receive several DuoNeb's and the patient has been using 

his inhalers at home and may be related to cellular shift from the albuterol.





Medication Reconcilliation


Current Medication List:  was personally reviewed by me





Blood Pressure Screening


Patient's blood pressure:  Elevated blood pressure


Monitored by the hospitalist





Consults


Time Called:  1703


Consulting Physician:  Dr. Amaral


Returned Call:  1705


Discussed the patient's case with Dr. Amaral. The patient will be evaluated for 

further treatment and disposition.





Impression





 Primary Impression:  


 Shortness of breath


 Additional Impression:  


 Hypokalemia





Scribe Attestation


The scribe's documentation has been prepared under my direction and personally 

reviewed by me in its entirety. I confirm that the note above accurately 

reflects all work, treatment, procedures, and medical decision making performed 

by me.





Departure Information


Dispostion


Being Evaluated By Hospitalist





Referrals


Jesus Paulson D.O. (PCP)





Patient Instructions


My [facility]





Problem Qualifiers

## 2018-01-23 VITALS
HEART RATE: 78 BPM | TEMPERATURE: 97.88 F | SYSTOLIC BLOOD PRESSURE: 118 MMHG | DIASTOLIC BLOOD PRESSURE: 78 MMHG | OXYGEN SATURATION: 93 %

## 2018-01-23 VITALS — HEART RATE: 66 BPM | OXYGEN SATURATION: 96 %

## 2018-01-23 VITALS — OXYGEN SATURATION: 94 % | HEART RATE: 72 BPM

## 2018-01-23 VITALS
OXYGEN SATURATION: 96 % | HEART RATE: 73 BPM | TEMPERATURE: 97.52 F | SYSTOLIC BLOOD PRESSURE: 121 MMHG | DIASTOLIC BLOOD PRESSURE: 77 MMHG

## 2018-01-23 VITALS — SYSTOLIC BLOOD PRESSURE: 129 MMHG | HEART RATE: 83 BPM | TEMPERATURE: 97.52 F | DIASTOLIC BLOOD PRESSURE: 86 MMHG

## 2018-01-23 VITALS — OXYGEN SATURATION: 95 %

## 2018-01-23 VITALS — HEART RATE: 67 BPM | OXYGEN SATURATION: 96 %

## 2018-01-23 VITALS
TEMPERATURE: 98.42 F | OXYGEN SATURATION: 91 % | DIASTOLIC BLOOD PRESSURE: 80 MMHG | HEART RATE: 72 BPM | SYSTOLIC BLOOD PRESSURE: 126 MMHG

## 2018-01-23 VITALS
HEART RATE: 61 BPM | DIASTOLIC BLOOD PRESSURE: 67 MMHG | OXYGEN SATURATION: 93 % | TEMPERATURE: 98.24 F | SYSTOLIC BLOOD PRESSURE: 102 MMHG

## 2018-01-23 VITALS — OXYGEN SATURATION: 97 % | HEART RATE: 71 BPM

## 2018-01-23 LAB
BASOPHILS # BLD: 0.03 K/UL (ref 0–0.2)
BASOPHILS NFR BLD: 0.6 %
BUN SERPL-MCNC: 14 MG/DL (ref 7–18)
CALCIUM SERPL-MCNC: 8.5 MG/DL (ref 8.5–10.1)
CO2 SERPL-SCNC: 25 MMOL/L (ref 21–32)
CREAT SERPL-MCNC: 0.82 MG/DL (ref 0.6–1.4)
EOS ABS #: 0.2 K/UL (ref 0–0.5)
EOSINOPHIL NFR BLD AUTO: 161 K/UL (ref 130–400)
GLUCOSE SERPL-MCNC: 93 MG/DL (ref 70–99)
HCT VFR BLD CALC: 40.9 % (ref 42–52)
HGB BLD-MCNC: 14.6 G/DL (ref 14–18)
IG#: 0.02 K/UL (ref 0–0.02)
IMM GRANULOCYTES NFR BLD AUTO: 26.3 %
LYMPHOCYTES # BLD: 1.39 K/UL (ref 1.2–3.4)
MCH RBC QN AUTO: 32.2 PG (ref 25–34)
MCHC RBC AUTO-ENTMCNC: 35.7 G/DL (ref 32–36)
MCV RBC AUTO: 90.1 FL (ref 80–100)
MONO ABS #: 0.39 K/UL (ref 0.11–0.59)
MONOCYTES NFR BLD: 7.4 %
NEUT ABS #: 3.26 K/UL (ref 1.4–6.5)
NEUTROPHILS # BLD AUTO: 3.8 %
NEUTROPHILS NFR BLD AUTO: 61.5 %
PMV BLD AUTO: 9.4 FL (ref 7.4–10.4)
POTASSIUM SERPL-SCNC: 4 MMOL/L (ref 3.5–5.1)
RED CELL DISTRIBUTION WIDTH CV: 13.6 % (ref 11.5–14.5)
RED CELL DISTRIBUTION WIDTH SD: 44.8 FL (ref 36.4–46.3)
SODIUM SERPL-SCNC: 141 MMOL/L (ref 136–145)
WBC # BLD AUTO: 5.29 K/UL (ref 4.8–10.8)

## 2018-01-23 RX ADMIN — LEVALBUTEROL HYDROCHLORIDE SCH MG: 1.25 SOLUTION RESPIRATORY (INHALATION) at 19:21

## 2018-01-23 RX ADMIN — GUAIFENESIN AND DEXTROMETHORPHAN PRN ML: 100; 10 SYRUP ORAL at 15:55

## 2018-01-23 RX ADMIN — GUAIFENESIN AND DEXTROMETHORPHAN PRN ML: 100; 10 SYRUP ORAL at 22:36

## 2018-01-23 RX ADMIN — ACETAMINOPHEN PRN MG: 325 TABLET ORAL at 09:37

## 2018-01-23 RX ADMIN — ENOXAPARIN SODIUM SCH MG: 40 INJECTION SUBCUTANEOUS at 19:48

## 2018-01-23 RX ADMIN — FEXOFENADINE HYDROCHLORIDE AND PSEUDOEPHEDRINE HYDROCHLORIDE SCH TAB: 60; 120 TABLET, FILM COATED, EXTENDED RELEASE ORAL at 19:48

## 2018-01-23 RX ADMIN — LEVALBUTEROL HYDROCHLORIDE SCH MG: 1.25 SOLUTION RESPIRATORY (INHALATION) at 14:18

## 2018-01-23 RX ADMIN — HYDROCODONE BITARTRATE AND ACETAMINOPHEN PRN TAB: 10; 325 TABLET ORAL at 19:46

## 2018-01-23 RX ADMIN — Medication SCH MCG: at 09:30

## 2018-01-23 RX ADMIN — PANTOPRAZOLE SCH MG: 40 TABLET, DELAYED RELEASE ORAL at 09:31

## 2018-01-23 RX ADMIN — METOCLOPRAMIDE SCH MG: 10 TABLET ORAL at 19:48

## 2018-01-23 RX ADMIN — METOCLOPRAMIDE SCH MG: 10 TABLET ORAL at 15:55

## 2018-01-23 RX ADMIN — METHYLPREDNISOLONE SODIUM SUCCINATE SCH MLS/MIN: 1 INJECTION, POWDER, FOR SOLUTION INTRAMUSCULAR; INTRAVENOUS at 16:46

## 2018-01-23 RX ADMIN — METHYLPREDNISOLONE SODIUM SUCCINATE SCH MLS/MIN: 1 INJECTION, POWDER, FOR SOLUTION INTRAMUSCULAR; INTRAVENOUS at 03:59

## 2018-01-23 RX ADMIN — EXTENDED PHENYTOIN SODIUM SCH MG: 100 CAPSULE ORAL at 09:31

## 2018-01-23 RX ADMIN — PANTOPRAZOLE SCH MG: 40 TABLET, DELAYED RELEASE ORAL at 19:48

## 2018-01-23 RX ADMIN — EXTENDED PHENYTOIN SODIUM SCH MG: 100 CAPSULE ORAL at 19:48

## 2018-01-23 RX ADMIN — AZITHROMYCIN MONOHYDRATE SCH MLS/HR: 500 INJECTION, POWDER, LYOPHILIZED, FOR SOLUTION INTRAVENOUS at 20:26

## 2018-01-23 RX ADMIN — LEVALBUTEROL HYDROCHLORIDE SCH MG: 1.25 SOLUTION RESPIRATORY (INHALATION) at 07:22

## 2018-01-23 RX ADMIN — GUAIFENESIN AND DEXTROMETHORPHAN PRN ML: 100; 10 SYRUP ORAL at 09:31

## 2018-01-23 RX ADMIN — METOCLOPRAMIDE SCH MG: 10 TABLET ORAL at 12:10

## 2018-01-23 NOTE — PULMONARY CONSULTATION
History


General


Date of Service:


Jan 23, 2018.


Stated Complaint:


Acute Bronchitis, Shortness Of Breath





HPI


The patient is a 62 year old male who presents to [facility] 

with complaints of Acute Bronchitis, Shortness Of Breath. The patient's primary 

care provider is Jesus Paulson D.O..


Historian:  patient


Onset:  other (several months)


Severity:  moderate


Complaint Status:  persistent





Review of Systems


Constitutional:  reports: weakness


Eyes:  reports: no symptoms


ENT:  reports: rhinorrhea


Cardiovascular:  reports: no symptoms


Respiratory:  reports: cough, orthopnea, shortness of breath


Gastrointestinal:  reports: no symptoms, other (heartburn)


Musculoskeletal:  reports: no symptoms


Integumentary:  reports: no symptoms


Neurologic:  reports: no symptoms


Psychiatric:  reports: no symptoms


Allergic / Immunologic:  no symptoms





Past Medical History


Past Medical History:


Noted in the H&P, denies any history of coronary artery disease, does not have 

any history of respiratory disease, he did have a significant workup in the 

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 

 shop.





Family History





FH: heart disease


FHx: gallbladder disease


Hypertension





Social History


Hx Tobacco Use In Past Year?:  No


Smoking Status:  Never Smoker


Occupational Status:  employed





Allergies


Coded Allergies:  


     Morphine (Unverified  Allergy, Unknown, IV, RED STREAKS UP ARM, 1/22/18)





Current Medications





Reported Home Medications








 Medications  Dose


 Route/Sig


 Max Daily Dose Days Date Category


 


 Vicodin Hp


  (10MG/300MG)


  (Hydrocodon/Acetaminophen


 10MG/300MG) 1 Tab


 Tab  1 Tab


 PO Q6 PRN


    1/22/18 Rx


 


 Combivent


 Respimat


  (Ipratropium-Albuterol)


 1 Aer Aer  1 Puffs


 INH QID


    1/22/18 Rx


 


 Prilosec


  (Omeprazole) 20


 Mg Capcr  20 Mg


 PO BID


    1/22/18 Rx


 


 Ventolin Hfa


  (Albuterol) 200


 Puffs/83158 Mcg


 Aers  2 Puffs


 INH Q4 PRN


    5/30/17 Rx


 


 B12


  (Cyanocobalamin)


 1,000 Mcg Tab  1 Tab


 PO DAILY


    3/4/17 Reported


 


 Dilantin


  (Phenytoin Sodium


 Extended) 30 Mg


 Cap  200 Mg


 PO BID


    3/4/17 Reported


 


 Folvite (Folic


 Acid) 1 Mg Tab  1 Tab


 PO DAILY


   90 3/4/17 Reported











Physical


Physical Exam


Vital Signs:











  Date Time  Temp Pulse Resp B/P (MAP) Pulse Ox O2 Delivery O2 Flow Rate FiO2


 


1/23/18 15:16 36.4 73 20 121/77 (92) 96 Room Air  


 


1/23/18 14:18  71 16  97 Room Air  


 


1/23/18 13:35 36.9 72 16 126/80 (95) 91   


 


1/23/18 08:00      Room Air  


 


1/23/18 08:00 36.4 83 20 129/86 (100)  Room Air  


 


1/23/18 07:22  72 16  94 Room Air  


 


1/23/18 05:58  67 16  96 Room Air  


 


1/23/18 04:02 36.8 61  102/67 (79) 93 Room Air  


 


1/23/18 04:00     95 Room Air  


 


1/22/18 23:59     95 Room Air  


 


1/22/18 23:49 36.6 86  126/85 (99) 95 Room Air  


 


1/22/18 21:05  68 16  96 Room Air  


 


1/22/18 20:00     95 Room Air  


 


1/22/18 19:34 37.0 80 20 145/84 95 Room Air  


 


1/22/18 17:50  87 20  92 Room Air  


 


1/22/18 16:16  82 20 142/75 92 Room Air  








General Appearance:  WELL-APPEARING


Eyes:  PERRLA, NO DISCHARGE


ENT:  other (deviated septum noted, no nasal congestion but he does have 

significant postnasal drip.)


Respiratory:  BREATH SOUNDS NORMAL


Cardiovasular:  REGULAR RATE/RHYTHM, NORMAL S1S2, NO M/G/R


Abdomen:  NON TENDER, NO REBOUND, NO MASSES


Lower Extremities:  NO EDEMA


Psychiatric:  NORMAL AFFECT





Diagnostics


Labs





Results Past 24 Hours








Test


  1/23/18


05:08 1/23/18


11:05 Range/Units


 


 


White Blood Count 5.29  4.8-10.8  K/uL


 


Red Blood Count 4.54  4.7-6.1  M/uL


 


Hemoglobin 14.6  14.0-18.0  g/dL


 


Hematocrit 40.9  42-52  %


 


Mean Corpuscular Volume 90.1    fL


 


Mean Corpuscular Hemoglobin 32.2  25-34  pg


 


Mean Corpuscular Hemoglobin


Concent 35.7


  


  32-36  g/dl


 


 


Platelet Count 161  130-400  K/uL


 


Mean Platelet Volume 9.4  7.4-10.4  fL


 


Neutrophils (%) (Auto) 61.5   %


 


Lymphocytes (%) (Auto) 26.3   %


 


Monocytes (%) (Auto) 7.4   %


 


Eosinophils (%) (Auto) 3.8   %


 


Basophils (%) (Auto) 0.6   %


 


Neutrophils # (Auto) 3.26  1.4-6.5  K/uL


 


Lymphocytes # (Auto) 1.39  1.2-3.4  K/uL


 


Monocytes # (Auto) 0.39  0.11-0.59  K/uL


 


Eosinophils # (Auto) 0.20  0-0.5  K/uL


 


Basophils # (Auto) 0.03  0-0.2  K/uL


 


RDW Standard Deviation 44.8  36.4-46.3  fL


 


RDW Coefficient of Variation 13.6  11.5-14.5  %


 


Immature Granulocyte % (Auto) 0.4   %


 


Immature Granulocyte # (Auto) 0.02  0.00-0.02  K/uL


 


Sodium Level 141  136-145  mmol/L


 


Potassium Level 4.0  3.5-5.1  mmol/L


 


Chloride Level 110    mmol/L


 


Carbon Dioxide Level 25  21-32  mmol/L


 


Anion Gap 6.0  3-11  mmol/L


 


Blood Urea Nitrogen 14  7-18  mg/dl


 


Creatinine


  0.82


  


  0.60-1.40


mg/dl


 


Est Creatinine Clear Calc


Drug Dose 90.3


  


   ml/min


 


 


Estimated GFR (


American) 109.8


  


   


 


 


Estimated GFR (Non-


American 94.8


  


   


 


 


BUN/Creatinine Ratio 16.5  10-20  


 


Random Glucose 93  70-99  mg/dl


 


Calcium Level 8.5  8.5-10.1  mg/dl


 


Magnesium Level 2.1  1.8-2.4  mg/dl








Microbiology Results


1/22/18 MRSA DNA Surveillance Screen - Final, Complete


          Specimen Negative for MRSA by DNA Probe


Radiology Interpretation:  CXR NORMAL (chest x-ray and CAT scan both were 

reviewed including CAT scan of the sinuses,), other (chest x-ray with 

hyperinflated lungs, CAT scan of the sinuses with mild sinusitis, CAT scan of 

the chest with movement artifact.)





Impression


Assessment and Plan


#1 given the patient's exposure to secondhand smoking as well as asbestos 

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 

significant exposure resulted and obstructive lung disease.  I do not see any 

evidence of asbestos exposure on a CAT scan of chest.  The patient also has a 

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 

for hypersensitivity pneumonitis.


#2 there is a for this patient cough is significant history of GERD which was 

taken omeprazole 40 mg twice a day.  He does have also significant history of 

sinusitis status post sinus surgery 20 years ago for septal deviation.


#3 cannot rule out the possibility of COPD.








Plan:





#1 sinus CT was ordered and I have reviewed myself.


#2 CAT scan of the chest was without high-resolution collimation, I have 

reviewed it with the radiologist and he does not feel there is a significant 

evidence of interstitial lung disease.


#3 evaluation of COPD with pulmonary function test as an outpatient.


#4 aggressive treatment of GERD with twice-daily PPI at high dose as well as 

Reglan 10 mg before meals and at bedtime.


#5 continue bronchodilators and steroids for now.


#6 although the patient had a history of influenza a he did have also viral 

infection back in May 2017, the prolonged effect were unlikely to be the cause 

of his presentation.


#7 agree with transferring the patient to [facility] floor.


#8 obtain hypersensitivity panel.





We'll follow.  Thank you for your kind referral.
Percutaneous transluminal coronary angioplasty status

## 2018-01-23 NOTE — DIAGNOSTIC IMAGING REPORT
CT SCAN OF THE PARANASAL SINUSES



CLINICAL HISTORY: Chronic sinusitis.



COMPARISON STUDY:  CT scan of the neck dated 3/4/2017.



TECHNIQUE:  High-resolution CT scan of the paranasal sinuses is performed.

Images are reviewed in the axial, sagittal, and coronal planes. IV contrast was

not administered for this examination.  A dose lowering technique was utilized

adhering to the principles of ALARA.



CT DOSE: 610.06 mGy.cm



FINDINGS:



Maxillary antra: There is mild nodular mucosal thickening seen bilaterally.



Anterior ethmoid sinuses: Trace mucosal thickening is seen bilaterally.



Posterior ethmoid sinuses: Trace mucosal thickening is seen bilaterally.



Sphenoid sinuses: Mild mucosal thickening is seen on the right. Trace mucosal

thickening is seen on the left.



Frontal sinuses: Mild mucosal thickening is seen bilaterally.



Ostiomeatal complexes: Patent bilaterally.



Frontoethmoidal and sphenoethmoidal recesses: The frontoethmoidal recesses are

patent, with narrowing on the right secondary to mucosal thickening. The

sphenoethmoidal recesses are patent, with mild narrowing the right secondary to

mucosal thickening.



Carotid arteries: The carotid arteries are protuberant but covered and without

septal attachments.



Ethmoid roofs: There is slightly asymmetric elevation of the right ethmoid roof

as compared to the left.



Nasal turbinates: Normal in appearance.



Nasal septum: There is leftward deviation of the bony nasal septum.



Optic nerves: Covered.



Orbits: The bony orbits are intact. Orbital contents are normal in appearance.



Calvarium: The imaged calvarium is normal in appearance



Mastoid air cells: Well pneumatized.



Brain parenchyma: Partially visualized brain parenchyma is within normal limits.





IMPRESSION: Mild paranasal sinus disease as above.







Electronically signed by:  Nehemiah Toth M.D.

1/23/2018 12:44 PM



Dictated Date/Time:  1/23/2018 12:40 PM

## 2018-01-23 NOTE — PROGRESS NOTE
Progress Note


Date of Service


Jan 23, 2018.





Progress Note


Subjective: Patient has been coughing and feeling throat discomfort.





Physical Exam


General: coughing, breathing on room air


Heart: RRR


Lungs: CTABL, no acute wheezing


Abdomen: soft, nontender, + bowel sounds


Extremities: no edema





This is a 62-year-old male who presents with ongoing shortness breath and cough.


1.  Persistent cough and shortness of breath 


Started on  IV Solu-Medrol 40 b.i.d., IV azithromycin.  Nebs t.i.d. and p.r.n





Patient evaluated by pulmonary consult





"#1 given the patient's exposure to secondhand smoking as well as asbestos 

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 

significant exposure resulted and obstructive lung disease.  I do not see any 

evidence of asbestos exposure on a CAT scan of chest.  The patient also has a 

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 

for hypersensitivity pneumonitis.


#2 there is a for this patient cough is significant history of GERD which was 

taken omeprazole 40 mg twice a day.  He does have also significant history of 

sinusitis status post sinus surgery 20 years ago for septal deviation.


#3 cannot rule out the possibility of COPD."





- sinus CT: Mild paranasal sinus disease 


-CAT scan of the chest -no  significant evidence of interstitial lung disease.


- evaluation of COPD with pulmonary function test as an outpatient.


-treatment of GERD with twice-daily PPI at high dose as well as Reglan 10 mg 

before meals and at bedtime.


-continue bronchodilators and steroids for now.


-although the patient had a history of influenza a he did have also viral 

infection back in May 2017, the prolonged effect were unlikely to be the cause 

of his presentation.


-obtain hypersensitivity panel.





History of epilepsy.  Continue home Dilantin.


History of back pain.  Currently stable, pain medications as needed.


Deep venous thrombosis prophylaxis, sequential compression devices and Lovenox.

## 2018-01-24 VITALS
TEMPERATURE: 97.7 F | OXYGEN SATURATION: 92 % | HEART RATE: 73 BPM | DIASTOLIC BLOOD PRESSURE: 71 MMHG | SYSTOLIC BLOOD PRESSURE: 109 MMHG

## 2018-01-24 VITALS — HEART RATE: 77 BPM | OXYGEN SATURATION: 96 %

## 2018-01-24 VITALS — HEART RATE: 99 BPM | OXYGEN SATURATION: 96 %

## 2018-01-24 VITALS
DIASTOLIC BLOOD PRESSURE: 71 MMHG | SYSTOLIC BLOOD PRESSURE: 109 MMHG | TEMPERATURE: 97.7 F | HEART RATE: 99 BPM | OXYGEN SATURATION: 96 %

## 2018-01-24 LAB
BASOPHILS # BLD: 0.07 K/UL (ref 0–0.2)
BASOPHILS NFR BLD: 1.5 %
BUN SERPL-MCNC: 16 MG/DL (ref 7–18)
CALCIUM SERPL-MCNC: 8.2 MG/DL (ref 8.5–10.1)
CO2 SERPL-SCNC: 28 MMOL/L (ref 21–32)
CREAT SERPL-MCNC: 0.83 MG/DL (ref 0.6–1.4)
EOS ABS #: 0.3 K/UL (ref 0–0.5)
EOSINOPHIL NFR BLD AUTO: 145 K/UL (ref 130–400)
GLUCOSE SERPL-MCNC: 97 MG/DL (ref 70–99)
HCT VFR BLD CALC: 41.5 % (ref 42–52)
HGB BLD-MCNC: 14.5 G/DL (ref 14–18)
IG#: 0.01 K/UL (ref 0–0.02)
IMM GRANULOCYTES NFR BLD AUTO: 30.6 %
LYMPHOCYTES # BLD: 1.44 K/UL (ref 1.2–3.4)
MCH RBC QN AUTO: 31.9 PG (ref 25–34)
MCHC RBC AUTO-ENTMCNC: 34.9 G/DL (ref 32–36)
MCV RBC AUTO: 91.2 FL (ref 80–100)
MONO ABS #: 0.37 K/UL (ref 0.11–0.59)
MONOCYTES NFR BLD: 7.9 %
NEUT ABS #: 2.52 K/UL (ref 1.4–6.5)
NEUTROPHILS # BLD AUTO: 6.4 %
NEUTROPHILS NFR BLD AUTO: 53.4 %
PMV BLD AUTO: 9.2 FL (ref 7.4–10.4)
POTASSIUM SERPL-SCNC: 3.7 MMOL/L (ref 3.5–5.1)
RED CELL DISTRIBUTION WIDTH CV: 13.8 % (ref 11.5–14.5)
RED CELL DISTRIBUTION WIDTH SD: 45.6 FL (ref 36.4–46.3)
SODIUM SERPL-SCNC: 140 MMOL/L (ref 136–145)
WBC # BLD AUTO: 4.71 K/UL (ref 4.8–10.8)

## 2018-01-24 RX ADMIN — FEXOFENADINE HYDROCHLORIDE AND PSEUDOEPHEDRINE HYDROCHLORIDE SCH TAB: 60; 120 TABLET, FILM COATED, EXTENDED RELEASE ORAL at 08:38

## 2018-01-24 RX ADMIN — GUAIFENESIN AND DEXTROMETHORPHAN PRN ML: 100; 10 SYRUP ORAL at 04:33

## 2018-01-24 RX ADMIN — Medication SCH MCG: at 08:38

## 2018-01-24 RX ADMIN — EXTENDED PHENYTOIN SODIUM SCH MG: 100 CAPSULE ORAL at 08:38

## 2018-01-24 RX ADMIN — METHYLPREDNISOLONE SODIUM SUCCINATE SCH MLS/MIN: 1 INJECTION, POWDER, FOR SOLUTION INTRAMUSCULAR; INTRAVENOUS at 04:31

## 2018-01-24 RX ADMIN — METOCLOPRAMIDE SCH MG: 10 TABLET ORAL at 12:12

## 2018-01-24 RX ADMIN — METOCLOPRAMIDE SCH MG: 10 TABLET ORAL at 07:15

## 2018-01-24 RX ADMIN — LEVALBUTEROL HYDROCHLORIDE SCH MG: 1.25 SOLUTION RESPIRATORY (INHALATION) at 07:12

## 2018-01-24 RX ADMIN — LEVALBUTEROL HYDROCHLORIDE SCH MG: 1.25 SOLUTION RESPIRATORY (INHALATION) at 14:24

## 2018-01-24 RX ADMIN — PANTOPRAZOLE SCH MG: 40 TABLET, DELAYED RELEASE ORAL at 08:38

## 2018-01-24 NOTE — DISCHARGE INSTRUCTIONS
Discharge Instructions


Date of Service


Jan 24, 2018.





Admission


Reason for Admission:  Acute Bronchitis, Shortness Of Breath





Discharge


Discharge Diagnosis / Problem:  bronchitis, asthma, possible COPD, GERD





Discharge Goals


Goal(s):  Improve function, Increase independence, Improve disease control





Activity Recommendations


Activity Limitations:  per Instructions/Follow-up section


Shower/Bathe:  no limitations





.





Instructions / Follow-Up


Instructions / Follow-Up


This is a 62-year-old male who presents with ongoing shortness breath and cough.





Patient was Started on  IV Solu-Medrol 40 b.i.d., IV azithromycin.  Nebs t.i.d. 

and p.r.n





Imaging: 


-sinus CT: Mild paranasal sinus disease 


-CAT scan of the chest -no  significant evidence of interstitial lung disease.





Patient evaluated by pulmonary consult on 1/23/18


"#1 given the patient's exposure to secondhand smoking as well as asbestos 

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 

significant exposure resulted and obstructive lung disease.  I do not see any 

evidence of asbestos exposure on a CAT scan of chest.  The patient also has a 

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 

for hypersensitivity pneumonitis.


#2 there is a for this patient cough is significant history of GERD which was 

taken omeprazole 40 mg twice a day.  He does have also significant history of 

sinusitis status post sinus surgery 20 years ago for septal deviation.


#3 cannot rule out the possibility of COPD."





Patient to be discharged home with prescriptions for nebulizer machine, 

albuterol/ipratropium nebulizer treatments, prednisone taper, oral azithromycin

, Allegra, Fluticasone nasal spray, Guaifenesin for possible COPD and cough 

symptoms; other differentials include bronchitis or asthma





Treat possible Gastric reflux disease (GERD) with twice-daily PPI at high dose 

as well as Reglan 10 mg before meals and at bedtime.





Patient has appointment with primary care 1/30/2018  2:40 PM Jesus GAMBINO DO [facility] [phone] 


recommend that patient be evaluated for COPD with pulmonary function test as an 

outpatient.


recommend that primary care doctor for hypersensitivity panels sent on 1/23/18


recommend that patient be referred to ENT. An appontment has been made for 2/13/ 2018  1:30 PM Franc Portillo DO Otolaryngology [facility]





History of epilepsy without seizure on this hospital admission.  Continue home 

Dilantin.





[facility] appointment number [phone]





Current Hospital Diet


Patient's current hospital diet: AHA Diet (Heart Healthy)





Discharge Diet


Recommended Diet:  AHA Diet (Heart Healthy)





Pending Studies


Studies pending at discharge:  yes


List of pending studies:  


hypersensitivity labs





Laboratory Results


1/24/18 05:35








Red Blood Count 4.55, Mean Corpuscular Volume 91.2, Mean Corpuscular Hemoglobin 

31.9, Mean Corpuscular Hemoglobin Concent 34.9, Mean Platelet Volume 9.2, 

Neutrophils (%) (Auto) 53.4, Lymphocytes (%) (Auto) 30.6, Monocytes (%) (Auto) 

7.9, Eosinophils (%) (Auto) 6.4, Basophils (%) (Auto) 1.5, Neutrophils # (Auto) 

2.52, Lymphocytes # (Auto) 1.44, Monocytes # (Auto) 0.37, Eosinophils # (Auto) 

0.30, Basophils # (Auto) 0.07





1/24/18 05:35

















Test


  1/22/18


15:10 1/23/18


11:05 1/24/18


05:35


 


Prothrombin Time


  10.5 SECONDS


(9.0-12.0) 


  


 


 


Prothromb Time International


Ratio 1.0 (0.9-1.1) 


  


  


 


 


Activated Partial


Thromboplast Time 27.1 SECONDS


(21.0-31.0) 


  


 


 


Partial Thromboplastin Ratio 1.0   


 


Troponin I


  < 0.015 ng/ml


(0-0.045) 


  


 


 


Pro-B-Type Natriuretic Peptide


  85 pg/ml


(0-900) 


  


 


 


White Blood Count


  


  


  4.71 K/uL


(4.8-10.8)


 


Red Blood Count


  


  


  4.55 M/uL


(4.7-6.1)


 


Hemoglobin


  


  


  14.5 g/dL


(14.0-18.0)


 


Hematocrit   41.5 % (42-52) 


 


Mean Corpuscular Volume


  


  


  91.2 fL


()


 


Mean Corpuscular Hemoglobin


  


  


  31.9 pg


(25-34)


 


Mean Corpuscular Hemoglobin


Concent 


  


  34.9 g/dl


(32-36)


 


Platelet Count


  


  


  145 K/uL


(130-400)


 


Mean Platelet Volume


  


  


  9.2 fL


(7.4-10.4)


 


Neutrophils (%) (Auto)   53.4 % 


 


Lymphocytes (%) (Auto)   30.6 % 


 


Monocytes (%) (Auto)   7.9 % 


 


Eosinophils (%) (Auto)   6.4 % 


 


Basophils (%) (Auto)   1.5 % 


 


Neutrophils # (Auto)


  


  


  2.52 K/uL


(1.4-6.5)


 


Lymphocytes # (Auto)


  


  


  1.44 K/uL


(1.2-3.4)


 


Monocytes # (Auto)


  


  


  0.37 K/uL


(0.11-0.59)


 


Eosinophils # (Auto)


  


  


  0.30 K/uL


(0-0.5)


 


Basophils # (Auto)


  


  


  0.07 K/uL


(0-0.2)


 


RDW Standard Deviation


  


  


  45.6 fL


(36.4-46.3)


 


RDW Coefficient of Variation


  


  


  13.8 %


(11.5-14.5)


 


Immature Granulocyte % (Auto)   0.2 % 


 


Immature Granulocyte # (Auto)


  


  


  0.01 K/uL


(0.00-0.02)


 


Anion Gap


  


  


  5.0 mmol/L


(3-11)


 


Est Creatinine Clear Calc


Drug Dose 


  


  89.3 ml/min 


 


 


Estimated GFR (


American) 


  


  109.3 


 


 


Estimated GFR (Non-


American 


  


  94.3 


 


 


BUN/Creatinine Ratio   18.8 (10-20) 


 


Calcium Level


  


  


  8.2 mg/dl


(8.5-10.1)


 


Magnesium Level


  


  


  2.1 mg/dl


(1.8-2.4)














 Date/Time


Source Procedure


Growth Status


 


 


 1/22/18 21:15


Nasal MRSA DNA Surveillance Screen - Final


Specimen Negative for MRSA by DNA Probe Complete











Medical Emergencies








.


Who to Call and When:





Medical Emergencies:  If at any time you feel your situation is an emergency, 

please call 911 immediately.





.





Non-Emergent Contact


Non-Emergency issues call your:  Primary Care Provider


Call Non-Emergent contact if:  your pain is not controlled, you have any 

medication questions





.


.








"Provider Documentation" section prepared by Juan Rodriguez.








.





VTE Core Measure


Inpt VTE Proph given/why not?:  SCD's

## 2018-01-24 NOTE — DISCHARGE SUMMARY
Discharge Summary


Date of Service


Jan 24, 2018.





Discharge Summary


Admission Date:


Jan 22, 2018 at 17:51


Discharge Date:  Jan 24, 2018


Discharge Disposition:  Home


Principal Diagnosis:


asthma, bronchitis, possible COPD, GERD


Consultations:


Pulmonary





Medication Reconciliation


New Medications:  


Ipratropium-Albuterol (Duoneb) 3 Ml Nebu


1 TREATMENT INH Q4H PRN for Shortness of Breath for 30 Days, #180 INHA





Nebulizer Machine (Home Use) (Nebulizer Machine (Home Use) )  Mis


EA N/A UD, #1





Prednisone (Prednisone Tab) 20 Mg Tab


0 PO DAILY for 5 Days, #7 TAB


2 TABS DAILY FOR 2 DAYS, THEN 1 TAB DAILY FOR 2 DAYS, THEN 1/2 TAB


 DAILY FOR 2 DAYS.


Azithromycin (Azithromycin) 250 Mg Tab


500 MG PO DAILY@2000 for 5 Days, #5 TAB





Fexofenadine-Pseudoephedrine (Allegra-D 12 Hour Allergy) 1 Tab Tab


1 TAB PO BID for 6 Days, #12 TAB





Fluticasone Propionate (Fluticasone Propionate) 50 Mcg/Act Spr


2 SPRAYS NA DAILY for 6 Days, #1 BTL





Guaifenesin/Dextromethorphan (Guaifenesin-Dm 100-10 mg/5Ml) 5 Ml/Cup Syrp


5 ML PO Q6H PRN for Cough for 6 Days, #1 BTL





Metoclopramide HCl (Metoclopramide HCl) 10 Mg Tab


10 MG PO ACHS for 10 Days, #20 TAB


take once in morning and once at night with meals


Pantoprazole (Pantoprazole Sodium) 40 Mg Tab


40 MG PO BID for 30 Days, #60 TAB





 


Continued Medications:  


Albuterol Hfa (Ventolin Hfa) 200 Puffs/90203 Mcg Aers


2 PUFFS INH Q4 PRN for SOB/Wheezing, #1 INHALER 1 Refill





Cyanocobalamin (B12) 1,000 Mcg Tab


1 TAB PO DAILY





Folic Acid (Folvite) 1 Mg Tab


1 TAB PO DAILY for 90 Days, #90 TAB 1 Refill





Hydrocodon/Acetaminophen 10MG/300MG (Vicodin Hp (10MG/300MG)) 1 Tab Tab


1 TAB PO Q6 PRN for Pain, #1 TAB





Ipratropium-Albuterol (Combivent Respimat) 1 Aer Aer


1 PUFFS INH QID, #1 INH





Phenytoin Sodium Extended (Dilantin) 30 Mg Cap


200 MG PO BID, CAP





 


Discontinued Medications:  


Omeprazole (Prilosec) 20 Mg Capcr


20 MG PO BID, #30 CAP











Admission Information


HPI (per Admitting provider):


CHIEF COMPLAINT:  Shortness of breath and cough.


 


HISTORY OF PRESENT ILLNESS:  This is a 62-year-old male with past medical


history significant for epilepsy, GERD, right bundle branch block, asthma,


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


shortness of breath and cough.  The patient was seen in the hospital in


 May 25 to May 30 of last year with influenza B.  He states that


since then he is still feeling his cough did not go away and in December he saw 

his family doctor and was treated with augmentin course  of steroids  for his 

bronchitis, but he says his symptoms are not getting better, in last week


it is getting worse, minimal activities making short of breath and lot of


cough, mostly dry, once in a while with whitish phlegm.  Denies any fevers,


no runny nose, no earache, no sore throat, no difficulty swallowing.  No 


body aches, no flu-like symptoms.  He  has some chest pain because of


cough.  Denies any abdominal pain, no nausea, no vomiting, no diarrhea, no


constipation, no blood in the stools.  No blood in the urine.  Normal bladder


movements.  Appetite is okay.  No swelling in the legs.  Currently resting


comfortably and hemodynamically stable, but on and off he gets coughing


spells.


Physical Exam (per Admitting):


PHYSICAL EXAMINATION:


GENERAL:  The patient is of moderate build, not in distress.


VITAL SIGNS:  Temperature 36.8, pulse 87, respiratory rate 20, blood pressure


142/75, oxygen 92% on room air.


HEENT:  No pallor, no icterus.  Pupils equal, round and react to light.


NECK:  No JVD, no neck masses, no carotid bruits.


CARDIOVASCULAR:  S1, S2 heard, regular rate and rhythm, no murmur, no gallop.


RESPIRATORY SYSTEM:  Normal AP diameter.  No accessory muscle use.  With


coughing and taking deep breath, has mild rhonchi bilaterally.  No crackles.


ABDOMEN:  Soft, bowel sounds present.  Nontender.  No distention.


CENTRAL NERVOUS SYSTEM:  Cranial nerves II-XII grossly intact.  Nonfocal.


EXTREMITIES:  No edema, no erythema.





Hospital Course


This is a 62-year-old male who presents with ongoing shortness breath and cough.





Patient was Started on  IV Solu-Medrol 40 b.i.d., IV azithromycin.  Nebs t.i.d. 

and p.r.n





Imaging: 


-sinus CT: Mild paranasal sinus disease 


-CAT scan of the chest -no  significant evidence of interstitial lung disease.





Patient evaluated by pulmonary consult on 1/23/18


"#1 given the patient's exposure to secondhand smoking as well as asbestos 

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 

significant exposure resulted and obstructive lung disease.  I do not see any 

evidence of asbestos exposure on a CAT scan of chest.  The patient also has a 

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 

for hypersensitivity pneumonitis.


#2 there is a for this patient cough is significant history of GERD which was 

taken omeprazole 40 mg twice a day.  He does have also significant history of 

sinusitis status post sinus surgery 20 years ago for septal deviation.


#3 cannot rule out the possibility of COPD."





Patient to be discharged home with prescriptions for nebulizer machine, 

albuterol/ipratropium nebulizer treatments, prednisone taper, oral azithromycin

, Allegra, Fluticasone nasal spray, Guaifenesin for possible COPD and cough 

symptoms; other differentials include bronchitis or asthma





Treat possible Gastric reflux disease (GERD) with twice-daily PPI at high dose 

as well as Reglan 10 mg before meals and at bedtime.





Patient has appointment with primary care 1/30/2018  2:40 PM Jesus GAMBINO DO [facility] [phone] 


recommend that patient be evaluated for COPD with pulmonary function test as an 

outpatient.


recommend that primary care doctor for hypersensitivity panels sent on 1/23/18


recommend that patient be referred to ENT. An appontment has been made for 2/13/ 2018  1:30 PM Franc Portillo DO Otolaryngology [facility]





History of epilepsy without seizure on this hospital admission.  Continue home 

Dilantin.





[facility] appointment number [phone]


Total time spent on discharge = 60 minutes


This includes examination of the patient, discharge planning, medication 

reconciliation, and communication with other providers.





Discharge Instructions


see above

## 2018-01-24 NOTE — PROGRESS NOTE
Internal Med Progress Note


Date of Service:


Jan 24, 2018.


Provider Documentation:


SUBJECTIVE: Patient has been coughing and feeling throat discomfort.





OBJECTIVE:


Physical Exam


General: breathing on room air


Neck: trachea midline, no JVD


Heart: RRR


Lungs: CTABL, no acute wheezing


Abdomen: soft, nontender, + bowel sounds


Extremities: no edema


ASSESSMENT & PLAN:


This is a 62-year-old male who presents with ongoing shortness breath and cough.





Patient was Started on  IV Solu-Medrol 40 b.i.d., IV azithromycin.  Nebs t.i.d. 

and p.r.n





Imaging: 


-sinus CT: Mild paranasal sinus disease 


-CAT scan of the chest -no  significant evidence of interstitial lung disease.





Patient evaluated by pulmonary consult on 1/23/18


"#1 given the patient's exposure to secondhand smoking as well as asbestos 

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 

significant exposure resulted and obstructive lung disease.  I do not see any 

evidence of asbestos exposure on a CAT scan of chest.  The patient also has a 

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 

for hypersensitivity pneumonitis.


#2 there is a for this patient cough is significant history of GERD which was 

taken omeprazole 40 mg twice a day.  He does have also significant history of 

sinusitis status post sinus surgery 20 years ago for septal deviation.


#3 cannot rule out the possibility of COPD."





Patient to be discharged home with prescriptions for nebulizer machine, 

albuterol/ipratropium nebulizer treatments, prednisone taper, oral azithromycin

, Allegra, Fluticasone nasal spray, Guaifenesin for possible COPD and cough 

symptoms; other differentials include bronchitis or asthma





Treat possible Gastric reflux disease (GERD) with twice-daily PPI at high dose 

as well as Reglan 10 mg before meals and at bedtime.





Patient has appointment with primary care 1/30/2018  2:40 PM Jesus GAMBINO DO [facility] [phone] 


recommend that patient be evaluated for COPD with pulmonary function test as an 

outpatient.


recommend that primary care doctor for hypersensitivity panels sent on 1/23/18


recommend that patient be referred to ENT. An appontment has been made for 2/13/ 2018  1:30 PM Franc Portillo DO Otolaryngology [facility]





History of epilepsy without seizure on this hospital admission.  Continue home 

Dilantin.





[facility] appointment number [phone]


Vital Signs:











  Date Time  Temp Pulse Resp B/P (MAP) Pulse Ox O2 Delivery O2 Flow Rate FiO2


 


1/24/18 14:25  99 16  96 Room Air  


 


1/24/18 08:45      Room Air  


 


1/24/18 07:55 36.5 73 18 109/71 (84) 92 Room Air  


 


1/24/18 07:13  77 15  96 Room Air  


 


1/24/18 00:34      Room Air  


 


1/23/18 23:28 36.6 78 20 118/78 (91) 93 Room Air  


 


1/23/18 21:00      Room Air  


 


1/23/18 19:22  66 16  96 Room Air  


 


1/23/18 16:10      Room Air  


 


1/23/18 15:16 36.4 73 20 121/77 (92) 96 Room Air  








Lab Results:





Results Past 24 Hours








Test


  1/24/18


05:35 Range/Units


 


 


White Blood Count 4.71 4.8-10.8  K/uL


 


Red Blood Count 4.55 4.7-6.1  M/uL


 


Hemoglobin 14.5 14.0-18.0  g/dL


 


Hematocrit 41.5 42-52  %


 


Mean Corpuscular Volume 91.2   fL


 


Mean Corpuscular Hemoglobin 31.9 25-34  pg


 


Mean Corpuscular Hemoglobin


Concent 34.9


  32-36  g/dl


 


 


Platelet Count 145 130-400  K/uL


 


Mean Platelet Volume 9.2 7.4-10.4  fL


 


Neutrophils (%) (Auto) 53.4  %


 


Lymphocytes (%) (Auto) 30.6  %


 


Monocytes (%) (Auto) 7.9  %


 


Eosinophils (%) (Auto) 6.4  %


 


Basophils (%) (Auto) 1.5  %


 


Neutrophils # (Auto) 2.52 1.4-6.5  K/uL


 


Lymphocytes # (Auto) 1.44 1.2-3.4  K/uL


 


Monocytes # (Auto) 0.37 0.11-0.59  K/uL


 


Eosinophils # (Auto) 0.30 0-0.5  K/uL


 


Basophils # (Auto) 0.07 0-0.2  K/uL


 


RDW Standard Deviation 45.6 36.4-46.3  fL


 


RDW Coefficient of Variation 13.8 11.5-14.5  %


 


Immature Granulocyte % (Auto) 0.2  %


 


Immature Granulocyte # (Auto) 0.01 0.00-0.02  K/uL


 


Sodium Level 140 136-145  mmol/L


 


Potassium Level 3.7 3.5-5.1  mmol/L


 


Chloride Level 107   mmol/L


 


Carbon Dioxide Level 28 21-32  mmol/L


 


Anion Gap 5.0 3-11  mmol/L


 


Blood Urea Nitrogen 16 7-18  mg/dl


 


Creatinine


  0.83


  0.60-1.40


mg/dl


 


Est Creatinine Clear Calc


Drug Dose 89.3


   ml/min


 


 


Estimated GFR (


American) 109.3


   


 


 


Estimated GFR (Non-


American 94.3


   


 


 


BUN/Creatinine Ratio 18.8 10-20  


 


Random Glucose 97 70-99  mg/dl


 


Calcium Level 8.2 8.5-10.1  mg/dl


 


Magnesium Level 2.1 1.8-2.4  mg/dl

## 2018-08-14 ENCOUNTER — HOSPITAL ENCOUNTER (OUTPATIENT)
Dept: HOSPITAL 45 - C.RAD1850 | Age: 63
Discharge: HOME | End: 2018-08-14
Attending: PHYSICIAN ASSISTANT
Payer: COMMERCIAL

## 2018-08-14 DIAGNOSIS — R05: Primary | ICD-10-CM

## 2018-08-14 NOTE — DIAGNOSTIC IMAGING REPORT
TWO VIEW CHEST



CLINICAL HISTORY: Cough.



FINDINGS: PA and lateral chest radiographs are compared to chest x-ray and chest

CT dated 1/22/2018. The cardiomediastinal silhouette is unremarkable.  The lungs

and pleural spaces are clear. There is no pneumothorax. The bony thorax appears

intact. Degenerative change is noted in the thoracic spine.



IMPRESSION: No active disease in the chest. 







Electronically signed by:  Nehemiah Toth M.D.

8/14/2018 12:42 PM



Dictated Date/Time:  8/14/2018 12:41 PM
